# Patient Record
Sex: FEMALE | Race: WHITE | NOT HISPANIC OR LATINO | URBAN - METROPOLITAN AREA
[De-identification: names, ages, dates, MRNs, and addresses within clinical notes are randomized per-mention and may not be internally consistent; named-entity substitution may affect disease eponyms.]

---

## 2017-01-02 ENCOUNTER — COMMUNICATION - HEALTHEAST (OUTPATIENT)
Dept: FAMILY MEDICINE | Facility: CLINIC | Age: 77
End: 2017-01-02

## 2017-01-02 DIAGNOSIS — G89.29 CHRONIC PAIN: ICD-10-CM

## 2017-01-29 ENCOUNTER — COMMUNICATION - HEALTHEAST (OUTPATIENT)
Dept: FAMILY MEDICINE | Facility: CLINIC | Age: 77
End: 2017-01-29

## 2017-01-29 DIAGNOSIS — G89.29 CHRONIC PAIN: ICD-10-CM

## 2017-01-29 DIAGNOSIS — E78.5 HYPERLIPIDEMIA: ICD-10-CM

## 2017-01-29 DIAGNOSIS — F32.9 MAJOR DEPRESSION: ICD-10-CM

## 2017-01-29 DIAGNOSIS — R41.3 MEMORY LOSS: ICD-10-CM

## 2017-02-08 ENCOUNTER — COMMUNICATION - HEALTHEAST (OUTPATIENT)
Dept: FAMILY MEDICINE | Facility: CLINIC | Age: 77
End: 2017-02-08

## 2017-02-20 ENCOUNTER — RECORDS - HEALTHEAST (OUTPATIENT)
Dept: ADMINISTRATIVE | Facility: OTHER | Age: 77
End: 2017-02-20

## 2017-02-21 ENCOUNTER — COMMUNICATION - HEALTHEAST (OUTPATIENT)
Dept: FAMILY MEDICINE | Facility: CLINIC | Age: 77
End: 2017-02-21

## 2017-02-21 DIAGNOSIS — R32 UNSPECIFIED URINARY INCONTINENCE: ICD-10-CM

## 2017-03-26 ENCOUNTER — COMMUNICATION - HEALTHEAST (OUTPATIENT)
Dept: FAMILY MEDICINE | Facility: CLINIC | Age: 77
End: 2017-03-26

## 2017-03-26 DIAGNOSIS — E11.8 TYPE 2 DIABETES MELLITUS WITH UNSPECIFIED COMPLICATIONS (H): ICD-10-CM

## 2017-03-26 DIAGNOSIS — J44.9 COPD (CHRONIC OBSTRUCTIVE PULMONARY DISEASE) (H): ICD-10-CM

## 2017-04-24 ENCOUNTER — OFFICE VISIT - HEALTHEAST (OUTPATIENT)
Dept: FAMILY MEDICINE | Facility: CLINIC | Age: 77
End: 2017-04-24

## 2017-04-24 DIAGNOSIS — E78.5 HYPERLIPIDEMIA: ICD-10-CM

## 2017-04-24 DIAGNOSIS — E11.8 TYPE 2 DIABETES MELLITUS WITH UNSPECIFIED COMPLICATIONS (H): ICD-10-CM

## 2017-04-24 DIAGNOSIS — I10 ESSENTIAL HYPERTENSION, BENIGN: ICD-10-CM

## 2017-04-24 LAB
CHOLEST SERPL-MCNC: 194 MG/DL
FASTING STATUS PATIENT QL REPORTED: YES
HBA1C MFR BLD: 10.4 % (ref 3.5–6)
HDLC SERPL-MCNC: 50 MG/DL
LDLC SERPL CALC-MCNC: 119 MG/DL
TRIGL SERPL-MCNC: 126 MG/DL

## 2017-04-24 ASSESSMENT — MIFFLIN-ST. JEOR: SCORE: 1489.2

## 2017-04-26 ENCOUNTER — RECORDS - HEALTHEAST (OUTPATIENT)
Dept: ADMINISTRATIVE | Facility: OTHER | Age: 77
End: 2017-04-26

## 2017-04-27 ENCOUNTER — AMBULATORY - HEALTHEAST (OUTPATIENT)
Dept: PODIATRY | Age: 77
End: 2017-04-27

## 2017-04-27 ENCOUNTER — COMMUNICATION - HEALTHEAST (OUTPATIENT)
Dept: FAMILY MEDICINE | Facility: CLINIC | Age: 77
End: 2017-04-27

## 2017-04-27 DIAGNOSIS — M79.673 PAIN OF FOOT, UNSPECIFIED LATERALITY: ICD-10-CM

## 2017-04-27 DIAGNOSIS — E11.8 TYPE 2 DIABETES MELLITUS WITH UNSPECIFIED COMPLICATIONS (H): ICD-10-CM

## 2017-04-27 DIAGNOSIS — L60.2 ONYCHAUXIS: ICD-10-CM

## 2017-04-28 ENCOUNTER — COMMUNICATION - HEALTHEAST (OUTPATIENT)
Dept: CARDIOLOGY | Facility: CLINIC | Age: 77
End: 2017-04-28

## 2017-04-28 DIAGNOSIS — I48.0 PAROXYSMAL ATRIAL FIBRILLATION (H): ICD-10-CM

## 2017-05-04 ENCOUNTER — COMMUNICATION - HEALTHEAST (OUTPATIENT)
Dept: CARDIOLOGY | Facility: CLINIC | Age: 77
End: 2017-05-04

## 2017-05-30 ENCOUNTER — COMMUNICATION - HEALTHEAST (OUTPATIENT)
Dept: FAMILY MEDICINE | Facility: CLINIC | Age: 77
End: 2017-05-30

## 2017-06-05 ENCOUNTER — RECORDS - HEALTHEAST (OUTPATIENT)
Dept: GENERAL RADIOLOGY | Facility: CLINIC | Age: 77
End: 2017-06-05

## 2017-06-05 ENCOUNTER — OFFICE VISIT - HEALTHEAST (OUTPATIENT)
Dept: FAMILY MEDICINE | Facility: CLINIC | Age: 77
End: 2017-06-05

## 2017-06-05 DIAGNOSIS — M25.511 PAIN IN RIGHT SHOULDER: ICD-10-CM

## 2017-06-05 DIAGNOSIS — M25.511 RIGHT SHOULDER PAIN: ICD-10-CM

## 2017-06-05 DIAGNOSIS — E11.8 TYPE 2 DIABETES MELLITUS WITH UNSPECIFIED COMPLICATIONS (H): ICD-10-CM

## 2017-06-05 ASSESSMENT — MIFFLIN-ST. JEOR: SCORE: 1524.92

## 2017-06-14 ENCOUNTER — COMMUNICATION - HEALTHEAST (OUTPATIENT)
Dept: FAMILY MEDICINE | Facility: CLINIC | Age: 77
End: 2017-06-14

## 2017-06-15 ENCOUNTER — COMMUNICATION - HEALTHEAST (OUTPATIENT)
Dept: ADMINISTRATIVE | Facility: CLINIC | Age: 77
End: 2017-06-15

## 2017-06-19 ENCOUNTER — RECORDS - HEALTHEAST (OUTPATIENT)
Dept: ADMINISTRATIVE | Facility: OTHER | Age: 77
End: 2017-06-19

## 2017-06-22 ENCOUNTER — COMMUNICATION - HEALTHEAST (OUTPATIENT)
Dept: FAMILY MEDICINE | Facility: CLINIC | Age: 77
End: 2017-06-22

## 2017-06-22 DIAGNOSIS — I48.0 PAROXYSMAL ATRIAL FIBRILLATION (H): ICD-10-CM

## 2017-06-22 DIAGNOSIS — E11.8 TYPE 2 DIABETES MELLITUS WITH UNSPECIFIED COMPLICATIONS (H): ICD-10-CM

## 2017-06-22 DIAGNOSIS — I10 ESSENTIAL HYPERTENSION, BENIGN: ICD-10-CM

## 2017-06-22 DIAGNOSIS — R32 UNSPECIFIED URINARY INCONTINENCE: ICD-10-CM

## 2017-06-22 DIAGNOSIS — M25.511 SHOULDER PAIN, RIGHT: ICD-10-CM

## 2017-06-22 RX ORDER — DILTIAZEM HYDROCHLORIDE 180 MG/1
180 CAPSULE, EXTENDED RELEASE ORAL DAILY
Qty: 90 CAPSULE | Refills: 1 | Status: SHIPPED | OUTPATIENT
Start: 2017-06-22

## 2017-06-22 RX ORDER — TROSPIUM CHLORIDE 20 MG/1
20 TABLET, FILM COATED ORAL DAILY
Qty: 90 TABLET | Refills: 0 | Status: SHIPPED | OUTPATIENT
Start: 2017-06-22

## 2017-06-27 ENCOUNTER — COMMUNICATION - HEALTHEAST (OUTPATIENT)
Dept: FAMILY MEDICINE | Facility: CLINIC | Age: 77
End: 2017-06-27

## 2017-06-27 DIAGNOSIS — G89.29 CHRONIC PAIN: ICD-10-CM

## 2017-06-28 ENCOUNTER — HOSPITAL ENCOUNTER (OUTPATIENT)
Dept: MRI IMAGING | Facility: CLINIC | Age: 77
Discharge: HOME | End: 2017-06-28
Attending: FAMILY MEDICINE

## 2017-06-28 DIAGNOSIS — M25.511 RIGHT SHOULDER PAIN: ICD-10-CM

## 2017-07-01 ENCOUNTER — COMMUNICATION - HEALTHEAST (OUTPATIENT)
Dept: FAMILY MEDICINE | Facility: CLINIC | Age: 77
End: 2017-07-01

## 2017-07-06 ENCOUNTER — HOSPITAL ENCOUNTER (OUTPATIENT)
Dept: MRI IMAGING | Facility: CLINIC | Age: 77
Discharge: HOME | End: 2017-07-06
Attending: FAMILY MEDICINE

## 2017-07-07 ENCOUNTER — COMMUNICATION - HEALTHEAST (OUTPATIENT)
Dept: FAMILY MEDICINE | Facility: CLINIC | Age: 77
End: 2017-07-07

## 2017-07-10 ENCOUNTER — COMMUNICATION - HEALTHEAST (OUTPATIENT)
Dept: FAMILY MEDICINE | Facility: CLINIC | Age: 77
End: 2017-07-10

## 2017-07-10 ENCOUNTER — RECORDS - HEALTHEAST (OUTPATIENT)
Dept: ADMINISTRATIVE | Facility: OTHER | Age: 77
End: 2017-07-10

## 2017-07-12 ENCOUNTER — COMMUNICATION - HEALTHEAST (OUTPATIENT)
Dept: FAMILY MEDICINE | Facility: CLINIC | Age: 77
End: 2017-07-12

## 2017-07-13 ENCOUNTER — COMMUNICATION - HEALTHEAST (OUTPATIENT)
Dept: FAMILY MEDICINE | Facility: CLINIC | Age: 77
End: 2017-07-13

## 2017-07-14 ENCOUNTER — OFFICE VISIT - HEALTHEAST (OUTPATIENT)
Dept: GERIATRICS | Facility: CLINIC | Age: 77
End: 2017-07-14

## 2017-07-14 DIAGNOSIS — I10 ESSENTIAL HYPERTENSION, BENIGN: ICD-10-CM

## 2017-07-14 DIAGNOSIS — J44.89 CHRONIC AIRWAY OBSTRUCTION, NOT ELSEWHERE CLASSIFIED: ICD-10-CM

## 2017-07-14 DIAGNOSIS — I48.0 PAROXYSMAL ATRIAL FIBRILLATION (H): ICD-10-CM

## 2017-07-14 DIAGNOSIS — E11.8 TYPE 2 DIABETES MELLITUS WITH UNSPECIFIED COMPLICATIONS (H): ICD-10-CM

## 2017-07-14 RX ORDER — ACETAMINOPHEN 500 MG
1000 TABLET ORAL 3 TIMES DAILY PRN
Status: SHIPPED | COMMUNITY
Start: 2017-07-14

## 2017-07-14 RX ORDER — INSULIN GLARGINE 100 [IU]/ML
30 INJECTION, SOLUTION SUBCUTANEOUS AT BEDTIME
Status: SHIPPED | COMMUNITY
Start: 2017-07-14

## 2017-07-14 ASSESSMENT — MIFFLIN-ST. JEOR: SCORE: 1534.56

## 2017-07-17 ENCOUNTER — OFFICE VISIT - HEALTHEAST (OUTPATIENT)
Dept: GERIATRICS | Facility: CLINIC | Age: 77
End: 2017-07-17

## 2017-07-17 DIAGNOSIS — I48.0 PAROXYSMAL ATRIAL FIBRILLATION (H): ICD-10-CM

## 2017-07-17 DIAGNOSIS — F41.1 ANXIETY STATE: ICD-10-CM

## 2017-07-17 DIAGNOSIS — R32 UNSPECIFIED URINARY INCONTINENCE: ICD-10-CM

## 2017-07-17 DIAGNOSIS — E11.8 TYPE 2 DIABETES MELLITUS WITH UNSPECIFIED COMPLICATIONS (H): ICD-10-CM

## 2017-07-17 DIAGNOSIS — R41.3 MEMORY LOSS: ICD-10-CM

## 2017-07-17 DIAGNOSIS — R29.6 FREQUENT FALLS: ICD-10-CM

## 2017-07-18 ENCOUNTER — AMBULATORY - HEALTHEAST (OUTPATIENT)
Dept: GERIATRICS | Facility: CLINIC | Age: 77
End: 2017-07-18

## 2017-07-19 ENCOUNTER — OFFICE VISIT - HEALTHEAST (OUTPATIENT)
Dept: GERIATRICS | Facility: CLINIC | Age: 77
End: 2017-07-19

## 2017-07-19 ENCOUNTER — COMMUNICATION - HEALTHEAST (OUTPATIENT)
Dept: FAMILY MEDICINE | Facility: CLINIC | Age: 77
End: 2017-07-19

## 2017-07-19 DIAGNOSIS — R41.3 MEMORY LOSS: ICD-10-CM

## 2017-07-19 DIAGNOSIS — J44.89 CHRONIC AIRWAY OBSTRUCTION, NOT ELSEWHERE CLASSIFIED: ICD-10-CM

## 2017-07-19 DIAGNOSIS — I48.0 PAROXYSMAL ATRIAL FIBRILLATION (H): ICD-10-CM

## 2017-07-19 DIAGNOSIS — E11.8 TYPE 2 DIABETES MELLITUS WITH UNSPECIFIED COMPLICATIONS (H): ICD-10-CM

## 2017-07-19 DIAGNOSIS — F32.9 MAJOR DEPRESSION: ICD-10-CM

## 2017-07-19 DIAGNOSIS — E78.5 HYPERLIPIDEMIA: ICD-10-CM

## 2017-07-19 DIAGNOSIS — I10 ESSENTIAL HYPERTENSION, BENIGN: ICD-10-CM

## 2017-07-19 DIAGNOSIS — G89.29 CHRONIC PAIN: ICD-10-CM

## 2017-07-19 RX ORDER — LISINOPRIL 2.5 MG/1
5 TABLET ORAL DAILY
Status: SHIPPED | COMMUNITY
Start: 2017-07-19

## 2017-07-21 RX ORDER — DULOXETIN HYDROCHLORIDE 30 MG/1
CAPSULE, DELAYED RELEASE ORAL
Qty: 90 CAPSULE | Refills: 1 | Status: SHIPPED | OUTPATIENT
Start: 2017-07-21

## 2017-07-24 ENCOUNTER — OFFICE VISIT - HEALTHEAST (OUTPATIENT)
Dept: GERIATRICS | Facility: CLINIC | Age: 77
End: 2017-07-24

## 2017-07-24 DIAGNOSIS — J44.89 CHRONIC AIRWAY OBSTRUCTION, NOT ELSEWHERE CLASSIFIED: ICD-10-CM

## 2017-07-24 DIAGNOSIS — R41.3 MEMORY LOSS: ICD-10-CM

## 2017-07-24 DIAGNOSIS — E11.8 TYPE 2 DIABETES MELLITUS WITH UNSPECIFIED COMPLICATIONS (H): ICD-10-CM

## 2017-07-24 DIAGNOSIS — I48.0 PAROXYSMAL ATRIAL FIBRILLATION (H): ICD-10-CM

## 2017-07-31 ENCOUNTER — OFFICE VISIT - HEALTHEAST (OUTPATIENT)
Dept: GERIATRICS | Facility: CLINIC | Age: 77
End: 2017-07-31

## 2017-07-31 DIAGNOSIS — G89.4 CHRONIC PAIN SYNDROME: ICD-10-CM

## 2017-07-31 DIAGNOSIS — I10 ESSENTIAL HYPERTENSION, BENIGN: ICD-10-CM

## 2017-07-31 DIAGNOSIS — F32.9 MAJOR DEPRESSION: ICD-10-CM

## 2017-07-31 DIAGNOSIS — E11.8 TYPE 2 DIABETES MELLITUS WITH UNSPECIFIED COMPLICATIONS (H): ICD-10-CM

## 2017-07-31 RX ORDER — TRAMADOL HYDROCHLORIDE 50 MG/1
50 TABLET ORAL EVERY 6 HOURS PRN
Status: SHIPPED | COMMUNITY
Start: 2017-07-31

## 2017-07-31 ASSESSMENT — MIFFLIN-ST. JEOR: SCORE: 1489.2

## 2017-08-07 ENCOUNTER — OFFICE VISIT - HEALTHEAST (OUTPATIENT)
Dept: GERIATRICS | Facility: CLINIC | Age: 77
End: 2017-08-07

## 2017-08-07 DIAGNOSIS — E11.8 TYPE 2 DIABETES MELLITUS WITH UNSPECIFIED COMPLICATIONS (H): ICD-10-CM

## 2017-08-07 DIAGNOSIS — I10 ESSENTIAL HYPERTENSION, BENIGN: ICD-10-CM

## 2017-08-07 DIAGNOSIS — I48.0 PAROXYSMAL ATRIAL FIBRILLATION (H): ICD-10-CM

## 2017-08-07 DIAGNOSIS — G89.4 CHRONIC PAIN SYNDROME: ICD-10-CM

## 2017-08-07 RX ORDER — METOPROLOL SUCCINATE 25 MG/1
12.5 TABLET, EXTENDED RELEASE ORAL DAILY
Status: SHIPPED | COMMUNITY
Start: 2017-08-07

## 2017-08-07 ASSESSMENT — MIFFLIN-ST. JEOR: SCORE: 1439.31

## 2017-08-09 ENCOUNTER — OFFICE VISIT - HEALTHEAST (OUTPATIENT)
Dept: GERIATRICS | Facility: CLINIC | Age: 77
End: 2017-08-09

## 2017-08-09 DIAGNOSIS — R41.3 MEMORY LOSS: ICD-10-CM

## 2017-08-09 DIAGNOSIS — E11.8 TYPE 2 DIABETES MELLITUS WITH UNSPECIFIED COMPLICATIONS (H): ICD-10-CM

## 2017-08-09 DIAGNOSIS — F32.9 MAJOR DEPRESSION: ICD-10-CM

## 2017-08-09 DIAGNOSIS — E78.5 HYPERLIPIDEMIA: ICD-10-CM

## 2017-08-09 DIAGNOSIS — J44.89 CHRONIC AIRWAY OBSTRUCTION, NOT ELSEWHERE CLASSIFIED: ICD-10-CM

## 2017-08-10 ENCOUNTER — COMMUNICATION - HEALTHEAST (OUTPATIENT)
Dept: FAMILY MEDICINE | Facility: CLINIC | Age: 77
End: 2017-08-10

## 2017-08-10 ENCOUNTER — AMBULATORY - HEALTHEAST (OUTPATIENT)
Dept: PODIATRY | Age: 77
End: 2017-08-10

## 2017-08-10 DIAGNOSIS — E11.8 TYPE 2 DIABETES MELLITUS WITH UNSPECIFIED COMPLICATIONS (H): ICD-10-CM

## 2017-08-10 DIAGNOSIS — L60.2 ONYCHAUXIS: ICD-10-CM

## 2017-08-10 DIAGNOSIS — M79.673 PAIN OF FOOT, UNSPECIFIED LATERALITY: ICD-10-CM

## 2017-08-10 ASSESSMENT — MIFFLIN-ST. JEOR: SCORE: 1439.31

## 2017-08-14 ENCOUNTER — OFFICE VISIT - HEALTHEAST (OUTPATIENT)
Dept: GERIATRICS | Facility: CLINIC | Age: 77
End: 2017-08-14

## 2017-08-14 DIAGNOSIS — F41.1 ANXIETY STATE: ICD-10-CM

## 2017-08-14 DIAGNOSIS — I10 ESSENTIAL HYPERTENSION, BENIGN: ICD-10-CM

## 2017-08-14 DIAGNOSIS — J44.89 CHRONIC AIRWAY OBSTRUCTION, NOT ELSEWHERE CLASSIFIED: ICD-10-CM

## 2017-08-14 DIAGNOSIS — E11.8 TYPE 2 DIABETES MELLITUS WITH UNSPECIFIED COMPLICATIONS (H): ICD-10-CM

## 2017-08-17 ENCOUNTER — AMBULATORY - HEALTHEAST (OUTPATIENT)
Dept: GERIATRICS | Facility: CLINIC | Age: 77
End: 2017-08-17

## 2018-01-15 ENCOUNTER — COMMUNICATION - HEALTHEAST (OUTPATIENT)
Dept: ADMINISTRATIVE | Facility: CLINIC | Age: 78
End: 2018-01-15

## 2018-04-04 ENCOUNTER — COMMUNICATION - HEALTHEAST (OUTPATIENT)
Dept: FAMILY MEDICINE | Facility: CLINIC | Age: 78
End: 2018-04-04

## 2018-05-21 ENCOUNTER — RECORDS - HEALTHEAST (OUTPATIENT)
Dept: ADMINISTRATIVE | Facility: OTHER | Age: 78
End: 2018-05-21

## 2021-05-25 ENCOUNTER — RECORDS - HEALTHEAST (OUTPATIENT)
Dept: ADMINISTRATIVE | Facility: CLINIC | Age: 81
End: 2021-05-25

## 2021-05-28 ENCOUNTER — RECORDS - HEALTHEAST (OUTPATIENT)
Dept: ADMINISTRATIVE | Facility: CLINIC | Age: 81
End: 2021-05-28

## 2021-05-30 VITALS — WEIGHT: 235 LBS | HEIGHT: 62 IN | BODY MASS INDEX: 43.24 KG/M2

## 2021-05-31 ENCOUNTER — RECORDS - HEALTHEAST (OUTPATIENT)
Dept: ADMINISTRATIVE | Facility: CLINIC | Age: 81
End: 2021-05-31

## 2021-05-31 VITALS — BODY MASS INDEX: 41.22 KG/M2 | WEIGHT: 224 LBS | HEIGHT: 62 IN

## 2021-05-31 VITALS — BODY MASS INDEX: 44.96 KG/M2 | WEIGHT: 245.8 LBS

## 2021-05-31 VITALS — WEIGHT: 242 LBS | BODY MASS INDEX: 44.53 KG/M2 | HEIGHT: 62 IN

## 2021-05-31 VITALS — BODY MASS INDEX: 41.12 KG/M2 | WEIGHT: 224.8 LBS

## 2021-05-31 VITALS — HEIGHT: 62 IN | WEIGHT: 235 LBS | BODY MASS INDEX: 43.24 KG/M2

## 2021-05-31 VITALS — WEIGHT: 224 LBS | BODY MASS INDEX: 40.97 KG/M2

## 2021-05-31 VITALS — HEIGHT: 62 IN | BODY MASS INDEX: 45.08 KG/M2 | WEIGHT: 245 LBS

## 2021-06-10 NOTE — PROGRESS NOTES
ASSESSMENT: Onychauxis, diabetes, foot pain.    PLAN: Toenails were debrided manually and mechanically x10. Return to clinic in nine weeks.         SUBJECTIVE: Toenails are long, thick and painful. Last nail debridement in the clinic was December 29, 2016.     OBJECTIVE:  Vascular: DP pulses are diminished. PT pulses are absent. Pedal hair is absent. Feet are cool to the touch.  Neuro: Sensation in the feet is grossly intact to light touch.  Derm:  Toenails are elongated, thickened and dystrophic with discoloration and subungual debris. Skin is thin and shiny but intact.   Musculoskeletal: Hammertoes. She walks with a cane.

## 2021-06-10 NOTE — PROGRESS NOTES
Assessment:  1.  Non-insulin-dependent diabetes mellitus, uncontrolled.  2.  Hypertension controlled.  3.  Hyperlipidemia, checking status.  4.  Chronic obstructive pulmonary disease.  5.  Memory loss but she and her  feel that she is stable at home at this time.  6.  History of the atrial fibrillation.    Plan: Recommend she check her blood sugars twice a day and record them and her  can help with that.  She can test different times of the day on different days to end up being able to look at 4 times a day over the whole week.  Recommend she stop the candy that she is eating and stop the daily ice cream.  Also look at the cream that she adds to coffee etc.  She has many options for reducing her calorie intake.  Again explained the importance of weight reduction, since she gained 15 pounds in the last 3 months with her eating habits.  Spent well in excess of 25 minutes with least 50% in counseling.  If her blood sugars improved by cutting out the candy and ice cream and watching portion sizes etc. then she can follow-up in 3 months for next check on diabetes.  If blood sugars are not improved without then recommend that she see our diabetic nurse educator for a consultation.  She and her  understand and agree.  I recommend a follow-up with cardiology, although her eye issue and a potential bleed could be affected by being on the Xarelto, given the rest of her situation it would also be appropriate to stay on the Xarelto to reduce the risk of heart attack or stroke.    4/25/2017 Addendum: See result note of labs. When note written, I was thinking she was off atorvastatin, but that is likely not the case. I tried to call home phone now, but no answer. It is likely that she missed some doses in the last week and that is why the lipids are higher. Recommend she try to take her atorvastatin faithfully and recheck level in 3 months.     Subjective: 76-year-old female presenting for follow-up on the  above.  Regarding diabetes she does not bring in the blood sugars that I had earlier asked for her to bring in given her last A1c was 8.2%.  They state that she is checking blood sugars once or twice a day but that they are often high such as 260.  She does have daily candy and daily ice cream.  She also has creamer in her coffee.  She certainly is not following a diabetic diet at all.  She is now seeing the eye clinic and sees retinal specialty.    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 Armando at vitreoretinal surgery.  Her  wonders if she should go off the Xarelto because of her eye difficulty.  Because of the history of the atrial fibrillation cardiology had put her on Xarelto.  She denies any current trouble with chest pain.  Breathing has been doing okay.  Other than the eyes she denies any other new issues.  Past Medical History:   Diagnosis Date     COPD (chronic obstructive pulmonary disease)      Current Outpatient Prescriptions   Medication Sig Dispense Refill     ACCU-[employer] MUNIRA PLUS TEST STRP strips USE 1 STRIP AS DIRECTED TWO TIMES A  each 2     albuterol (PROAIR HFA) 90 mcg/actuation inhaler Inhale 1-2 puffs every 4 (four) hours as needed. Every 4-6 hours as needed.       albuterol (PROVENTIL) 2.5 mg /3 mL (0.083 %) nebulizer solution Take 2.5 mg by nebulization every 6 (six) hours as needed for wheezing.       atorvastatin (LIPITOR) 80 MG tablet TAKE ONE TABLET BY MOUTH AT BEDTIME 90 tablet 1     cholecalciferol, vitamin D3, (VITAMIN D3) 1,000 unit capsule Take 1,000 Units by mouth daily.       DILT- mg 24 hr capsule        DULoxetine (CYMBALTA) 30 MG capsule TAKE ONE CAPSULE BY MOUTH EVERY EVENING 90 capsule 1     DULoxetine (CYMBALTA) 60 MG capsule TAKE ONE CAPSULE BY MOUTH EVERY DAY 90 capsule 1     lancing device with lancets (ACCU-[employer] MULTICLIX LANCET) Kit Use 1 strip As Directed 2 (two) times a day. 200 each 5     metFORMIN  "(GLUCOPHAGE) 1000 MG tablet TAKE ONE TABLET BY MOUTH TWO TIMES A DAY WITH MEALS 180 tablet 0     rivaroxaban (XARELTO) 20 mg Tab Take 1 tablet (20 mg total) by mouth daily with supper. 90 tablet 3     SPIRIVA WITH HANDIHALER 18 mcg inhalation capsule INHALE THE CONTENTS OF 1 CAPSULE ONCE DAILY USING HANDIHALER DEVICE. TO GET FULL DOSE, BREATHE OUT AND INHALE ONCE AGAIN. 90 capsule 1     trospium (SANCTURA) 20 mg tablet TAKE ONE TABLET BY MOUTH EVERY DAY 90 tablet 0     No current facility-administered medications for this visit.      Allergies   Allergen Reactions     Codeine Nausea And Vomiting     Meperidine Unknown     Pt does not recall     Tetanus Toxoid Unknown     Tramadol Other (See Comments)     Annotation: legs restless and felt pain worse       Doxycycline Hyclate Rash     Sulfa (Sulfonamide Antibiotics) Rash     Annotation: see on call nursing note- itchy raised rash after on tmp-smz for 7 days       She denies any swelling in her legs.  She did have a fall around [place] time but has not had any recent falls.  Her  notes that she was in [facility] for just 2 days in their memory unit and then he states it did not work out so he took her back home and feels they have been doing okay at home at this point.    Objective:/78  Pulse 84  Ht 5' 2\" (1.575 m)  Wt (!) 235 lb (106.6 kg)  BMI 42.98 kg/m2  HEENT shows no acute change.  Neck supple.  Lungs clear.  Heart regular rate and rhythm without murmur.  Abdomen rotund but no masses tenderness or hepatosplenomegaly.  No pedal edema.  She her weight is up 15 pounds from last visit.  "

## 2021-06-11 NOTE — PROGRESS NOTES
[facility]      Facility:    [facility] [[id number]]  Code Status: UNKNOWN      Chief Complaint/Reason for Visit:  Chief Complaint   Patient presents with     Review Of Multiple Medical Conditions       HPI:   Jody is a 76 y.o. female who presents to the hospital with right shoulder pain and shortness of breath.  Patient arrived via EMS report for concerns of COPD exacerbation and right shoulder pain.  She stated that she has had this pain for weeks, has not been evaluated primary care and orthopedics.  On July 12, 2017 the patient states that she she was getting in the bathroom when her  saw that she was short of breath and said that he is not doing this for the rest of the week.  EMS was called and the patient was brought to the emergency room for further evaluation.  In the ER she endorsed right shoulder pain, denied any change, described it as stiff and rates it 5 out of 10 on a pain scale.  Previous MRI of the right shoulder was conducted on July 6, 2017.  Conclusion was no evidence of partial or full-thickness rotator rotator cuff tear.  No evidence of a partial or full-thickness rotator cuff tear. Mild tendinopathy in the subscapularis and supraspinatus tendons. Moderate-sized glenohumeral joint effusion with some localized synovitis in the subscapularis recess but no evidence of intra-articular loose bodies. Mild degenerative osteoarthritic changes at the glenohumeral.  She was diagnosed with right shoulder pain related to frozen shoulder. A x-ray of the chest was completed on July 12, 2017 and it revealed there was opacification present peripherally on the right upper lobe which could relate to developing pneumonia.  She was given IV fluids and DuoNeb for COPD exacerbation.  Per possibility of pneumonia and comorbidities she was given ceftriaxone and discharged on Levaquin, which will end on July 22, 2017.  Her labs showed no acute concerning findings.   Troponin was negative ECG was nonischemic, though PCO2 was 68 though she is a retainer with a home oxygen 3 L per nasal cannula.     TCU: Today I found Jody laying in bed.  She stated that she did not necessarily like the food here as well, which was the case previously.  We also talked about her blood sugars as we have recently increased her Lantus 18 units at bedtime from the previous, 10 units on admission.  Today I am going to increase her to 20 units at at bedtime.  Her blood sugars fasting have been 150-159, lunch 219-305, and supper 121-214, and at bedtime 132-227.  Also per her right frozen shoulder I have ordered Tylenol 1000 mg 3 times daily as needed.  This seems to be a chronic problem.  Previous imaging has been negative.  We have also trying to wean her off her oxygen though currently she is at 3 L nasal cannula and has no desire to decrease that level of O2.     Patient denies pain, headache, chest pain, numbness or tingling, shortest of breath when sitting though is on 3L NC chronically, eating or swallowing concerns, nausea or vomiting, diarrhea or bowel abnormalities, or no new integumentary concerns today.      Past Medical History:  Past Medical History:   Diagnosis Date     COPD (chronic obstructive pulmonary disease)            Surgical History:  Past Surgical History:   Procedure Laterality Date     MS REMOVAL OF TONSILS,<11 Y/O      Description: Tonsillectomy;  Recorded: 04/15/2008;     MS TOTAL HIP ARTHROPLASTY      Description: Total Hip Replacement;  Proc Date: 07/21/2010;  Comments: at [facility]       Family History:   No family history on file.    Social History:    Social History     Social History     Marital status:      Spouse name: N/A     Number of children: N/A     Years of education: N/A     Social History Main Topics     Smoking status: Former Smoker     Smokeless tobacco: Not on file     Alcohol use Yes      Comment: 4 beers per year     Drug use: No     Sexual  activity: Not on file     Other Topics Concern     Not on file     Social History Narrative        Review of Systems   Constitutional: Positive for activity change, appetite change and fatigue. Negative for diaphoresis.   HENT: Negative.  Negative for congestion and facial swelling.    Eyes: Negative.    Respiratory: Positive for shortness of breath. Negative for cough, chest tightness, wheezing and stridor.         SOB when doing therapy, on 3L NC   Cardiovascular: Negative.  Negative for chest pain.   Endocrine: Negative.    Genitourinary: Negative.    Musculoskeletal: Positive for back pain.        Severe weakness on R UE   Skin: Negative.    Allergic/Immunologic: Negative.    Neurological: Positive for weakness. Negative for tremors, numbness and headaches.   Psychiatric/Behavioral: Positive for confusion. Negative for agitation and sleep disturbance. The patient is nervous/anxious.         A/O x2-3, some cognitive slowing noted       Vitals:    07/19/17 1108   BP: 138/83   Pulse: 80   Resp: 20   Temp: 98.2  F (36.8  C)   SpO2: 94%   Weight: (!) 245 lb 12.8 oz (111.5 kg)       Physical Exam   Constitutional: She appears well-developed.   HENT:   Head: Normocephalic and atraumatic.   Eyes: Right eye exhibits no discharge. Left eye exhibits no discharge. No scleral icterus.   Neck: Normal range of motion. Neck supple.   Cardiovascular: Exam reveals no gallop and no friction rub.    Murmur heard.  Irregular rate and rhythm, rate controlled, heart tones distant   Pulmonary/Chest: No stridor. No respiratory distress. She has no wheezes.   Diminished, clear, on 3L NC   Abdominal: Soft. Bowel sounds are normal. She exhibits no distension.   Musculoskeletal: She exhibits edema and tenderness.   Weakness on R UE, 1-2+ edema bilaterally LEs, tender posterior on R LE, denies pain   Neurological: She is alert.   A/O x2-3   Skin: Skin is warm and dry. She is not diaphoretic.   Psychiatric:   Seemed aloof     Medication  List:  Current Outpatient Prescriptions   Medication Sig     lisinopril (PRINIVIL,ZESTRIL) 2.5 MG tablet Take 2.5 mg by mouth daily.     ACCU-[employer]K MUNIRA PLUS TEST STRP strips USE 1 STRIP AS DIRECTED TWO TIMES A DAY     acetaminophen (TYLENOL) 500 MG tablet Take 1,000 mg by mouth 3 (three) times a day as needed for pain.     albuterol (PROAIR HFA) 90 mcg/actuation inhaler Inhale 1-2 puffs every 4 (four) hours as needed. Every 4-6 hours as needed.     albuterol (PROVENTIL) 2.5 mg /3 mL (0.083 %) nebulizer solution Take 2.5 mg by nebulization every 6 (six) hours as needed for wheezing.     atorvastatin (LIPITOR) 80 MG tablet Take 80 mg by mouth at bedtime.     cholecalciferol, vitamin D3, (VITAMIN D3) 1,000 unit capsule Take 1,000 Units by mouth daily.     DILT- mg 24 hr capsule Take 1 capsule (180 mg total) by mouth daily.     DULoxetine (CYMBALTA) 30 MG capsule Take 30 mg by mouth every evening.     DULoxetine (CYMBALTA) 60 MG capsule Take 60 mg by mouth daily.     insulin glargine (LANTUS) 100 unit/mL injection Inject 20 Units under the skin at bedtime.      lancing device with lancets (ACCU-[employer] MULTICLIX LANCET) Kit Use 1 strip As Directed 2 (two) times a day.     levoFLOXacin (LEVAQUIN) 750 MG tablet Take 1 tablet (750 mg total) by mouth daily for 10 days.     metFORMIN (GLUCOPHAGE) 1000 MG tablet Take 1 tablet (1,000 mg total) by mouth 2 (two) times a day with meals.     methyl salicylate-menthol (SARATH DIETZ GREASELESS) 15-10 % Crea Apply 1 application topically 2 (two) times a day. To right shoulder     rivaroxaban (XARELTO) 20 mg Tab Take 1 tablet (20 mg total) by mouth daily with supper.     tiotropium (SPIRIVA) 18 mcg inhalation capsule Place 18 mcg into inhaler and inhale daily.     trospium (SANCTURA) 20 mg tablet Take 1 tablet (20 mg total) by mouth daily.     VIT C/E/ZN/COPPR/LUTEIN/ZEAXAN (PRESERVISION AREDS 2 ORAL) Take 1 tablet by mouth 2 (two) times a day.       Labs:  Recent Results (from the  past 240 hour(s))   Basic Metabolic Panel   Result Value Ref Range    Sodium 142 136 - 145 mmol/L    Potassium 4.8 3.5 - 5.0 mmol/L    Chloride 103 98 - 107 mmol/L    CO2 30 22 - 31 mmol/L    Anion Gap, Calculation 9 5 - 18 mmol/L    Glucose 407 (HH) 70 - 125 mg/dL    Calcium 10.3 8.5 - 10.5 mg/dL    BUN 24 8 - 28 mg/dL    Creatinine 0.96 0.60 - 1.10 mg/dL    GFR MDRD Af Amer >60 >60 mL/min/1.73m2    GFR MDRD Non Af Amer 57 (L) >60 mL/min/1.73m2   HM2 (CBC W/O DIFF)   Result Value Ref Range    WBC 8.2 4.0 - 11.0 thou/uL    RBC 4.83 3.80 - 5.40 mill/uL    Hemoglobin 13.3 12.0 - 16.0 g/dL    Hematocrit 42.4 35.0 - 47.0 %    MCV 88 80 - 100 fL    MCH 27.5 27.0 - 34.0 pg    MCHC 31.4 (L) 32.0 - 36.0 g/dL    RDW 15.0 (H) 11.0 - 14.5 %    Platelets 164 140 - 440 thou/uL    MPV 13.4 (H) 8.5 - 12.5 fL   Troponin I   Result Value Ref Range    Troponin I 0.01 0.00 - 0.29 ng/mL   BNP(B-type Natriuretic Peptide)   Result Value Ref Range    BNP 20 0 - 140 pg/mL   ECG 12 lead nursing unit performed   Result Value Ref Range    SYSTOLIC BLOOD PRESSURE 185 mmHg    DIASTOLIC BLOOD PRESSURE 80 mmHg    VENTRICULAR RATE 74 BPM    ATRIAL RATE 74 BPM    P-R INTERVAL 172 ms    QRS DURATION 88 ms    Q-T INTERVAL 380 ms    QTC CALCULATION (BEZET) 421 ms    P Axis 61 degrees    R AXIS 68 degrees    T AXIS 68 degrees    MUSE DIAGNOSIS       Normal sinus rhythm  Low voltage QRS  Cannot rule out Anterior infarct , age undetermined  Abnormal ECG  When compared with ECG of 12-JAN-2016 13:58,  Minimal criteria for Anterior infarct are now Present  Confirmed by AGUSTINA CASTREJON MD LOC:[facility] ([id number]) on 7/12/2017 3:44:40 PM     Blood Gases, Venous   Result Value Ref Range    pH, Venous 7.31 (L) 7.35 - 7.45    pCO2, Venous 68 (H) 35 - 50 mm Hg    pO2, Daniel 26 25 - 47 mm Hg    Base Excess, Venous 5.5 mmol/L    HCO3, Venous 27.7 24.0 - 30.0 mmol/L    Oxyhemoglobin 43.6 (L) 70.0 - 75.0 %    O2 Sat, Venous 45.1 (L) 70.0 - 75.0 %     MRI OF THE RIGHT  SHOULDER  7/6/2017 12:03 PM     INDICATION: 76-year-old with severe shoulder pain. No prior right shoulder surgery.  TECHNIQUE: Routine.  Exam was initially started on 06/28/2017, however because of hardware failure the patient had to return on 7/6/2017 for a complete study.  COMPARISON: 06/05/2017.     FINDINGS: The exam is mildly degraded by patient motion which persisted throughout the entire study.      ROTATOR CUFF: There is no evidence of a partial or full-thickness rotator cuff tear. There is mild tendinopathy in the subscapularis tendon as seen on series 3 image 12. Mild tendinopathy in the supraspinatus tendon as seen on series 5.2 image 15. The   infraspinatus and teres minor tendons are intact. No evidence of acute injury or atrophy of any of the rotator cuff muscles.     ACROMIOCLAVICULAR REGION: Type II acromion anatomy without significant enthesophyte formation. Moderate AC joint arthrosis with slight inferior articular spurring but no resulting subacromial space narrowing. Small amount of fluid in the   subacromial-subdeltoid recess.     GLENOHUMERAL JOINT: Moderate-sized glenohumeral joint effusion without evidence of intra-articular loose bodies. There is some localized synovitis within the subscapularis recess. Mild diffuse partial-thickness articular cartilage thinning involving the   glenoid and humeral head. There is mild localized subchondral edema in the posterior margin of the glenoid rim. The long head of the biceps tendon is intact and normally located. The glenoid labrum is intact.     BONES AND SOFT TISSUES: No fracture or contusion. No focal marrow replacing lesion. No findings to suggest recent or prior humeral head dislocation.     IMPRESSION:   CONCLUSION:  1.  No evidence of a partial or full-thickness rotator cuff tear.     2.  Mild tendinopathy in the subscapularis and supraspinatus tendons.     3.  Moderate-sized glenohumeral joint effusion with some localized synovitis in the  subscapularis recess but no evidence of intra-articular loose bodies.     4.  Mild degenerative osteoarthritic changes at the glenohumeral joint.    Assessment:    ICD-10-CM    1. Type 2 diabetes mellitus with unspecified complications E11.8    2. Benign Essential Hypertension I10    3. Paroxysmal atrial fibrillation I48.0    4. Chronic Obstructive Pulmonary Disease J44.9      Plan:  1. Increase lantus to 20U at HS  2. Start lisinopril 2.5mg daily  3.Continue NOAC  4. Monitor, on 3L NC, with orders to wean to 88-92%    The care plan has been reviewed and all orders signed. Changes to care plan, if any, as noted. Otherwise, continue care plan of care.    Electronically signed by: Jerardo Cooper NP

## 2021-06-11 NOTE — PROGRESS NOTES
[facility]      Facility:    [facility] [[id number]]  Code Status: UNKNOWN      Chief Complaint/Reason for Visit:  Chief Complaint   Patient presents with     Review Of Multiple Medical Conditions       HPI:   Jody is a 76 y.o. female who presents to the hospital with right shoulder pain and shortness of breath.  Patient arrived via EMS report for concerns of COPD exacerbation and right shoulder pain.  She stated that she has had this pain for weeks, has not been evaluated primary care and orthopedics.  On July 12, 2017 the patient states that she she was getting in the bathroom when her  saw that she was short of breath and said that he is not doing this for the rest of the week.  EMS was called and the patient was brought to the emergency room for further evaluation.  In the ER she endorsed right shoulder pain, denied any change, described it as stiff and rates it 5 out of 10 on a pain scale.  Previous MRI of the right shoulder was conducted on July 6, 2017.  Conclusion was no evidence of partial or full-thickness rotator rotator cuff tear.  No evidence of a partial or full-thickness rotator cuff tear. Mild tendinopathy in the subscapularis and supraspinatus tendons. Moderate-sized glenohumeral joint effusion with some localized synovitis in the subscapularis recess but no evidence of intra-articular loose bodies. Mild degenerative osteoarthritic changes at the glenohumeral.  She was diagnosed with right shoulder pain related to frozen shoulder. A x-ray of the chest was completed on July 12, 2017 and it revealed there was opacification present peripherally on the right upper lobe which could relate to developing pneumonia.  She was given IV fluids and DuoNeb for COPD exacerbation.  Per possibility of pneumonia and comorbidities she was given ceftriaxone and discharged on Levaquin, which will end on July 22, 2017.  Her labs showed no acute concerning findings.   Troponin was negative ECG was nonischemic, though PCO2 was 68 though she is a retainer with a home oxygen 3 L per nasal cannula.     TCU: Today I found Jody sitting on her bedside eating her lunch with complaints of pain related to her right shoulder.  She stated that she did not necessarily like the food here as well.  I asked her what her normal blood sugars have been.  She states that they have been high, 300-500 at times.  I have also noticed her last A1c was 10.4 on April 24, 2017.  I asked her about starting insulin and she stated that it was not necessary.  After discussion about possible side effects of chronic hyperglycemia she decided that it would be pertinent to talked with her  about starting this therapy.  After subsequent communication with  (both on the phone and in person) he decided that it would be in her best interest.  I have ordered Lantus 10 units at bedtime and nursing will be checking her blood sugars 4 times daily.  We will continue to monitor. Per her pain in her right shoulder I have ordered Tylenol 1000 mg 3 times daily as needed.     Patient denies pain, headache, chest pain, numbness or tingling, shortest of breath when sitting though is on 3L NC chronically, eating or swallowing concerns, nausea or vomiting, diarrhea or bowel abnormalities, or no new integumentary concerns today.  Patient's  would like to talk with PCP next visit.      Past Medical History:  Past Medical History:   Diagnosis Date     COPD (chronic obstructive pulmonary disease)            Surgical History:  Past Surgical History:   Procedure Laterality Date     NC REMOVAL OF TONSILS,<13 Y/O      Description: Tonsillectomy;  Recorded: 04/15/2008;     NC TOTAL HIP ARTHROPLASTY      Description: Total Hip Replacement;  Proc Date: 07/21/2010;  Comments: at [facility]       Family History:   No family history on file.    Social History:    Social History     Social History     Marital status:   "    Spouse name: N/A     Number of children: N/A     Years of education: N/A     Social History Main Topics     Smoking status: Former Smoker     Smokeless tobacco: Not on file     Alcohol use Yes      Comment: 4 beers per year     Drug use: No     Sexual activity: Not on file     Other Topics Concern     Not on file     Social History Narrative          Review of Systems   Constitutional: Positive for activity change, appetite change and fatigue. Negative for diaphoresis.   HENT: Negative.  Negative for congestion and facial swelling.    Eyes: Negative.    Respiratory: Positive for shortness of breath. Negative for cough, chest tightness, wheezing and stridor.         SOB when doing therapy, on 3L NC   Cardiovascular: Negative.  Negative for chest pain.   Endocrine: Negative.    Genitourinary: Negative.    Musculoskeletal: Positive for back pain.        Severe weakness on R UE   Skin: Negative.    Allergic/Immunologic: Negative.    Neurological: Positive for weakness. Negative for tremors, numbness and headaches.   Psychiatric/Behavioral: Positive for confusion. Negative for agitation and sleep disturbance. The patient is nervous/anxious.         A/O x2-3, some cognitive slowing noted       Vitals:    07/14/17 1243   BP: 152/82   Pulse: 76   Resp: 18   Temp: 98.3  F (36.8  C)   SpO2: 92%   Weight: (!) 245 lb (111.1 kg)   Height: 5' 2\" (1.575 m)       Physical Exam   Constitutional: She appears well-developed.   HENT:   Head: Normocephalic and atraumatic.   Eyes: Right eye exhibits no discharge. Left eye exhibits no discharge. No scleral icterus.   Neck: Normal range of motion. Neck supple.   Cardiovascular: Exam reveals no gallop and no friction rub.    Murmur heard.  Irregular rate and rhythm, rate controlled, heart tones distant   Pulmonary/Chest: No stridor. No respiratory distress. She has no wheezes.   Diminished, clear, on 3L NC   Abdominal: Soft. Bowel sounds are normal. She exhibits no distension. "   Musculoskeletal: She exhibits edema and tenderness.   Weakness on R UE, 1-2+ edema bilaterally LEs, tender posterior on R LE, denies pain   Neurological: She is alert.   A/O x2-3   Skin: Skin is warm and dry. She is not diaphoretic.   Psychiatric:   Seemed aloof       Medication List:  Current Outpatient Prescriptions   Medication Sig     acetaminophen (TYLENOL) 500 MG tablet Take 1,000 mg by mouth 3 (three) times a day as needed for pain.     insulin glargine (LANTUS) 100 unit/mL injection Inject 10 Units under the skin at bedtime.     ACCU-CHEK MUNIRA PLUS TEST STRP strips USE 1 STRIP AS DIRECTED TWO TIMES A DAY     albuterol (PROAIR HFA) 90 mcg/actuation inhaler Inhale 1-2 puffs every 4 (four) hours as needed. Every 4-6 hours as needed.     albuterol (PROVENTIL) 2.5 mg /3 mL (0.083 %) nebulizer solution Take 2.5 mg by nebulization every 6 (six) hours as needed for wheezing.     atorvastatin (LIPITOR) 80 MG tablet Take 80 mg by mouth at bedtime.     cholecalciferol, vitamin D3, (VITAMIN D3) 1,000 unit capsule Take 1,000 Units by mouth daily.     DILT- mg 24 hr capsule Take 1 capsule (180 mg total) by mouth daily.     DULoxetine (CYMBALTA) 30 MG capsule Take 30 mg by mouth every evening.     DULoxetine (CYMBALTA) 60 MG capsule Take 60 mg by mouth daily.     lancing device with lancets (ACCU-What[employer] MULTICLIX LANCET) Kit Use 1 strip As Directed 2 (two) times a day.     levoFLOXacin (LEVAQUIN) 750 MG tablet Take 1 tablet (750 mg total) by mouth daily for 10 days.     metFORMIN (GLUCOPHAGE) 1000 MG tablet Take 1 tablet (1,000 mg total) by mouth 2 (two) times a day with meals.     rivaroxaban (XARELTO) 20 mg Tab Take 1 tablet (20 mg total) by mouth daily with supper.     tiotropium (SPIRIVA) 18 mcg inhalation capsule Place 18 mcg into inhaler and inhale daily.     trospium (SANCTURA) 20 mg tablet Take 1 tablet (20 mg total) by mouth daily.     VIT C/E/ZN/COPPR/LUTEIN/ZEAXAN (PRESERVISION AREDS 2 ORAL) Take 1 tablet  by mouth 2 (two) times a day.       Labs:  Recent Results (from the past 240 hour(s))   Basic Metabolic Panel   Result Value Ref Range    Sodium 142 136 - 145 mmol/L    Potassium 4.8 3.5 - 5.0 mmol/L    Chloride 103 98 - 107 mmol/L    CO2 30 22 - 31 mmol/L    Anion Gap, Calculation 9 5 - 18 mmol/L    Glucose 407 (HH) 70 - 125 mg/dL    Calcium 10.3 8.5 - 10.5 mg/dL    BUN 24 8 - 28 mg/dL    Creatinine 0.96 0.60 - 1.10 mg/dL    GFR MDRD Af Amer >60 >60 mL/min/1.73m2    GFR MDRD Non Af Amer 57 (L) >60 mL/min/1.73m2   HM2 (CBC W/O DIFF)   Result Value Ref Range    WBC 8.2 4.0 - 11.0 thou/uL    RBC 4.83 3.80 - 5.40 mill/uL    Hemoglobin 13.3 12.0 - 16.0 g/dL    Hematocrit 42.4 35.0 - 47.0 %    MCV 88 80 - 100 fL    MCH 27.5 27.0 - 34.0 pg    MCHC 31.4 (L) 32.0 - 36.0 g/dL    RDW 15.0 (H) 11.0 - 14.5 %    Platelets 164 140 - 440 thou/uL    MPV 13.4 (H) 8.5 - 12.5 fL   Troponin I   Result Value Ref Range    Troponin I 0.01 0.00 - 0.29 ng/mL   BNP(B-type Natriuretic Peptide)   Result Value Ref Range    BNP 20 0 - 140 pg/mL   ECG 12 lead nursing unit performed   Result Value Ref Range    SYSTOLIC BLOOD PRESSURE 185 mmHg    DIASTOLIC BLOOD PRESSURE 80 mmHg    VENTRICULAR RATE 74 BPM    ATRIAL RATE 74 BPM    P-R INTERVAL 172 ms    QRS DURATION 88 ms    Q-T INTERVAL 380 ms    QTC CALCULATION (BEZET) 421 ms    P Axis 61 degrees    R AXIS 68 degrees    T AXIS 68 degrees    MUSE DIAGNOSIS       Normal sinus rhythm  Low voltage QRS  Cannot rule out Anterior infarct , age undetermined  Abnormal ECG  When compared with ECG of 12-JAN-2016 13:58,  Minimal criteria for Anterior infarct are now Present  Confirmed by AGUSTINA CASTREJON MD LOC:[facility] ([id number]) on 7/12/2017 3:44:40 PM     Blood Gases, Venous   Result Value Ref Range    pH, Venous 7.31 (L) 7.35 - 7.45    pCO2, Venous 68 (H) 35 - 50 mm Hg    pO2, Daniel 26 25 - 47 mm Hg    Base Excess, Venous 5.5 mmol/L    HCO3, Venous 27.7 24.0 - 30.0 mmol/L    Oxyhemoglobin 43.6 (L) 70.0 - 75.0 %    O2  Sat, Venous 45.1 (L) 70.0 - 75.0 %     MRI OF THE RIGHT SHOULDER  7/6/2017 12:03 PM     INDICATION: 76-year-old with severe shoulder pain. No prior right shoulder surgery.  TECHNIQUE: Routine.  Exam was initially started on 06/28/2017, however because of hardware failure the patient had to return on 7/6/2017 for a complete study.  COMPARISON: 06/05/2017.     FINDINGS: The exam is mildly degraded by patient motion which persisted throughout the entire study.      ROTATOR CUFF: There is no evidence of a partial or full-thickness rotator cuff tear. There is mild tendinopathy in the subscapularis tendon as seen on series 3 image 12. Mild tendinopathy in the supraspinatus tendon as seen on series 5.2 image 15. The   infraspinatus and teres minor tendons are intact. No evidence of acute injury or atrophy of any of the rotator cuff muscles.     ACROMIOCLAVICULAR REGION: Type II acromion anatomy without significant enthesophyte formation. Moderate AC joint arthrosis with slight inferior articular spurring but no resulting subacromial space narrowing. Small amount of fluid in the   subacromial-subdeltoid recess.     GLENOHUMERAL JOINT: Moderate-sized glenohumeral joint effusion without evidence of intra-articular loose bodies. There is some localized synovitis within the subscapularis recess. Mild diffuse partial-thickness articular cartilage thinning involving the   glenoid and humeral head. There is mild localized subchondral edema in the posterior margin of the glenoid rim. The long head of the biceps tendon is intact and normally located. The glenoid labrum is intact.     BONES AND SOFT TISSUES: No fracture or contusion. No focal marrow replacing lesion. No findings to suggest recent or prior humeral head dislocation.     IMPRESSION:   CONCLUSION:  1.  No evidence of a partial or full-thickness rotator cuff tear.     2.  Mild tendinopathy in the subscapularis and supraspinatus tendons.     3.  Moderate-sized glenohumeral  joint effusion with some localized synovitis in the subscapularis recess but no evidence of intra-articular loose bodies.     4.  Mild degenerative osteoarthritic changes at the glenohumeral joint.    Assessment:    ICD-10-CM    1. Type 2 diabetes mellitus with unspecified complications E11.8    2. Benign Essential Hypertension I10    3. Paroxysmal atrial fibrillation I48.0    4. Chronic Obstructive Pulmonary Disease J44.9        Plan:  1. Start lantus 10U at HS  2. Monitor, may need ACE  3.Continue NOAC  4. Monitor, on 3L NC, may need PFT    The care plan has been reviewed and all orders signed. Changes to care plan, if any, as noted. Otherwise, continue care plan of care.  The total time spent with this patient was greater than 40 minutes, with greater than 50% spent in counseling and coordination of care that included multiple issues    Electronically signed by: Jerardo Cooper NP

## 2021-06-11 NOTE — PROGRESS NOTES
[facility]      Code Status:  FULL CODE  Visit Type: H & P     Facility:  [facility] [[id number]]           History of Present Illness: Jody Gil is a 76 y.o. female who is currently admitted to the TCU as a transfer from the emergency room.  She was examined today and history was also supplemented by her  who requested to meet with me as per the history this is a elderly patient who has been progressively declining  In her home for the last few years worse in the last 6 months as per the .  He has noticed increasing physical debility with frequent falls and inability to meet her own needs.  He has been her main caregiver and has been assisting her more and more every day in her ADLs.  He is noticing more episodes of confusion.  She presented to the hospital with shoulder pain associated with shortness of breath.  She does not have much recall of what eventually happened in the emergency room but it was decided at that time that she may have some bronchitis and she was given Levaquin for 10 days.  Her oxygen needs are at baseline at 3 L she is refusing to take her inhalers today.  She is also diabetic and has not been managing her blood sugars very well her A1c was greater than 10 and she has been started on Lantus.  Sugars remain in the 200s+ range in the TCU and patient's  states that she does not manage her diet or her diabetes very well  She continues to complain of shoulder pain and has difficulty ambulating and is in the TCU for rehab    Past Medical History:   Diagnosis Date     COPD (chronic obstructive pulmonary disease)      Past Surgical History:   Procedure Laterality Date     TX REMOVAL OF TONSILS,<11 Y/O      Description: Tonsillectomy;  Recorded: 04/15/2008;     TX TOTAL HIP ARTHROPLASTY      Description: Total Hip Replacement;  Proc Date: 07/21/2010;  Comments: at [facility]     No family history on file.  Social History     Social  History     Marital status:      Spouse name: N/A     Number of children: N/A     Years of education: N/A     Occupational History     Not on file.     Social History Main Topics     Smoking status: Former Smoker     Smokeless tobacco: Not on file     Alcohol use Yes      Comment: 4 beers per year     Drug use: No     Sexual activity: Not on file     Other Topics Concern     Not on file     Social History Narrative     Current Outpatient Prescriptions   Medication Sig Dispense Refill     ACCU-[employer] MUNIRA PLUS TEST STRP strips USE 1 STRIP AS DIRECTED TWO TIMES A  each 2     acetaminophen (TYLENOL) 500 MG tablet Take 1,000 mg by mouth 3 (three) times a day as needed for pain.       albuterol (PROAIR HFA) 90 mcg/actuation inhaler Inhale 1-2 puffs every 4 (four) hours as needed. Every 4-6 hours as needed.       albuterol (PROVENTIL) 2.5 mg /3 mL (0.083 %) nebulizer solution Take 2.5 mg by nebulization every 6 (six) hours as needed for wheezing.       atorvastatin (LIPITOR) 80 MG tablet Take 80 mg by mouth at bedtime.       cholecalciferol, vitamin D3, (VITAMIN D3) 1,000 unit capsule Take 1,000 Units by mouth daily.       DILT- mg 24 hr capsule Take 1 capsule (180 mg total) by mouth daily. 90 capsule 1     DULoxetine (CYMBALTA) 30 MG capsule Take 30 mg by mouth every evening.       DULoxetine (CYMBALTA) 60 MG capsule Take 60 mg by mouth daily.       insulin glargine (LANTUS) 100 unit/mL injection Inject 10 Units under the skin at bedtime.       lancing device with lancets (ACCU[employer] MULTICLIX LANCET) Kit Use 1 strip As Directed 2 (two) times a day. 200 each 5     levoFLOXacin (LEVAQUIN) 750 MG tablet Take 1 tablet (750 mg total) by mouth daily for 10 days. 10 tablet 0     metFORMIN (GLUCOPHAGE) 1000 MG tablet Take 1 tablet (1,000 mg total) by mouth 2 (two) times a day with meals. 180 tablet 0     rivaroxaban (XARELTO) 20 mg Tab Take 1 tablet (20 mg total) by mouth daily with supper. 90 tablet 3      tiotropium (SPIRIVA) 18 mcg inhalation capsule Place 18 mcg into inhaler and inhale daily.       trospium (SANCTURA) 20 mg tablet Take 1 tablet (20 mg total) by mouth daily. 90 tablet 0     VIT C/E/ZN/COPPR/LUTEIN/ZEAXAN (PRESERVISION AREDS 2 ORAL) Take 1 tablet by mouth 2 (two) times a day.       No current facility-administered medications for this visit.      Allergies   Allergen Reactions     Codeine Nausea And Vomiting     Meperidine Unknown     Pt does not recall     Tetanus Toxoid Unknown     Tramadol Other (See Comments)     Annotation: legs restless and felt pain worse       Doxycycline Hyclate Rash     Sulfa (Sulfonamide Antibiotics) Rash     Annotation: see on call nursing note- itchy raised rash after on tmp-smz for 7 days           Review of Systems:    Constitutional: Negative.  Negative for fever, chills,HAS activity change, appetite change and fatigue.   HENT: Negative for congestion and facial swelling.    Eyes: Negative for photophobia, redness and visual disturbance.   Respiratory: Negative for cough and chest tightness.    Cardiovascular: Negative for chest pain, palpitations and leg swelling.   Gastrointestinal: Negative for nausea, diarrhea, constipation, blood in stool and abdominal distention.   Genitourinary: Negative.    Has developed urinary incontinence  Musculoskeletal: Negative.  falls often  Skin: Negative.    Neurological: Negative for dizziness, tremors, syncope, weakness, light-headedness and headaches.   Hematological: Does not bruise/bleed easily.   Psychiatric/Behavioral: Negative.  And no longer participate in her needs because of progressive cognitive decline as per     Vitals:    07/17/17 1029   BP: 138/82   Pulse: 80   Resp: 18   Temp: 98  F (36.7  C)       Physical Exam:    GENERAL: no acute distress. Cooperative in conversation. Obese;rambling  HEENT: pupils are equal, round and reactive. Oral mucosa is moist and intact.  RESP:Chest symmetric. Regular respiratory  rate. No stridor.  CVS: S1S2. IRREGULAR  ABD: Nondistended, soft.  EXTREMITIES: L>R  lower extremity edema.   NEURO: non focal. Alert and oriented x3. CONFUSED AND she has a limited recall  PSYCH: within normal limits. No depression or anxiety.  SKIN: warm dry intact     Labs:    Recent Results (from the past 240 hour(s))   Basic Metabolic Panel   Result Value Ref Range    Sodium 142 136 - 145 mmol/L    Potassium 4.8 3.5 - 5.0 mmol/L    Chloride 103 98 - 107 mmol/L    CO2 30 22 - 31 mmol/L    Anion Gap, Calculation 9 5 - 18 mmol/L    Glucose 407 (HH) 70 - 125 mg/dL    Calcium 10.3 8.5 - 10.5 mg/dL    BUN 24 8 - 28 mg/dL    Creatinine 0.96 0.60 - 1.10 mg/dL    GFR MDRD Af Amer >60 >60 mL/min/1.73m2    GFR MDRD Non Af Amer 57 (L) >60 mL/min/1.73m2   HM2 (CBC W/O DIFF)   Result Value Ref Range    WBC 8.2 4.0 - 11.0 thou/uL    RBC 4.83 3.80 - 5.40 mill/uL    Hemoglobin 13.3 12.0 - 16.0 g/dL    Hematocrit 42.4 35.0 - 47.0 %    MCV 88 80 - 100 fL    MCH 27.5 27.0 - 34.0 pg    MCHC 31.4 (L) 32.0 - 36.0 g/dL    RDW 15.0 (H) 11.0 - 14.5 %    Platelets 164 140 - 440 thou/uL    MPV 13.4 (H) 8.5 - 12.5 fL   Troponin I   Result Value Ref Range    Troponin I 0.01 0.00 - 0.29 ng/mL   BNP(B-type Natriuretic Peptide)   Result Value Ref Range    BNP 20 0 - 140 pg/mL   ECG 12 lead nursing unit performed   Result Value Ref Range    SYSTOLIC BLOOD PRESSURE 185 mmHg    DIASTOLIC BLOOD PRESSURE 80 mmHg    VENTRICULAR RATE 74 BPM    ATRIAL RATE 74 BPM    P-R INTERVAL 172 ms    QRS DURATION 88 ms    Q-T INTERVAL 380 ms    QTC CALCULATION (BEZET) 421 ms    P Axis 61 degrees    R AXIS 68 degrees    T AXIS 68 degrees    MUSE DIAGNOSIS       Normal sinus rhythm  Low voltage QRS  Cannot rule out Anterior infarct , age undetermined  Abnormal ECG  When compared with ECG of 12-JAN-2016 13:58,  Minimal criteria for Anterior infarct are now Present  Confirmed by AGUSTINA CASTREJON MD LOC:[facility] ([id number]) on 7/12/2017 3:44:40 PM     Blood Gases, Venous   Result  Value Ref Range    pH, Venous 7.31 (L) 7.35 - 7.45    pCO2, Venous 68 (H) 35 - 50 mm Hg    pO2, Daniel 26 25 - 47 mm Hg    Base Excess, Venous 5.5 mmol/L    HCO3, Venous 27.7 24.0 - 30.0 mmol/L    Oxyhemoglobin 43.6 (L) 70.0 - 75.0 %    O2 Sat, Venous 45.1 (L) 70.0 - 75.0 %     Xr Chest Ap    Result Date: 7/12/2017  XR CHEST AP 7/12/2017 8:41 AM INDICATION: sob COMPARISON: 12/25/2015 FINDINGS: Lung volumes lower on this AP projection. There is vague opacity present peripheral right upper lobe which could relate to developing pneumonia. Left lung base not well visualized, likely due to patient's size. Heart size is normal.    Xr Shoulder Right 2 Or More Vws    Result Date: 7/12/2017  XR SHOULDER RIGHT 2 OR MORE VWS 7/12/2017 8:41 AM INDICATION: Shoulder pain.    COMPARISON: 6/5/2017 FINDINGS: No significant change. Mild degenerative changes. No fracture or dislocation. There is a vague opacity involving the peripheral right upper lobe, consider follow-up chest x-ray.    Mr Shoulder Without Contrast Right    Result Date: 7/6/2017  MRI OF THE RIGHT SHOULDER 7/6/2017 12:03 PM INDICATION: 76-year-old with severe shoulder pain. No prior right shoulder surgery. TECHNIQUE: Routine. Exam was initially started on 06/28/2017, however because of hardware failure the patient had to return on 7/6/2017 for a complete study. COMPARISON: 06/05/2017. FINDINGS: The exam is mildly degraded by patient motion which persisted throughout the entire study. ROTATOR CUFF: There is no evidence of a partial or full-thickness rotator cuff tear. There is mild tendinopathy in the subscapularis tendon as seen on series 3 image 12. Mild tendinopathy in the supraspinatus tendon as seen on series 5.2 image 15. The infraspinatus and teres minor tendons are intact. No evidence of acute injury or atrophy of any of the rotator cuff muscles. ACROMIOCLAVICULAR REGION: Type II acromion anatomy without significant enthesophyte formation. Moderate AC joint  arthrosis with slight inferior articular spurring but no resulting subacromial space narrowing. Small amount of fluid in the subacromial-subdeltoid recess. GLENOHUMERAL JOINT: Moderate-sized glenohumeral joint effusion without evidence of intra-articular loose bodies. There is some localized synovitis within the subscapularis recess. Mild diffuse partial-thickness articular cartilage thinning involving the glenoid and humeral head. There is mild localized subchondral edema in the posterior margin of the glenoid rim. The long head of the biceps tendon is intact and normally located. The glenoid labrum is intact. BONES AND SOFT TISSUES: No fracture or contusion. No focal marrow replacing lesion. No findings to suggest recent or prior humeral head dislocation.     CONCLUSION: 1.  No evidence of a partial or full-thickness rotator cuff tear. 2.  Mild tendinopathy in the subscapularis and supraspinatus tendons. 3.  Moderate-sized glenohumeral joint effusion with some localized synovitis in the subscapularis recess but no evidence of intra-articular loose bodies. 4.  Mild degenerative osteoarthritic changes at the glenohumeral joint.   Lab Results   Component Value Date    HGBA1C 10.4 (H) 04/24/2017       Assessment/Plan:   COPD with hypoxic respiratory failure presenting to the hospital with acute shortness of breath suspected to be in part due to noncompliance in my mind after discussing her concerns with her  as well as her  She is on baseline oxygen needs at 3 L by nasal cannula with no weaning attempt to be made  Currently on Levaquin for 10 days continue with the same  Right shoulder pain with the imaging in the hospital done did not show any partial or full-thickness rotator cuff tear however some synovitis with joint effusion was noted and degenerative changes were needed  Conservative treatment with pain management scheduling her Tylenol and therapy if no improvement she can consider a visit to orthopedics  for further management  Diabetes type 2 poorly managed .  She was started on Lantus currently at 10 unit and Accu-Cheks 4 times a day upon admission. my plan is to increase her Lantus further to 18 units and monitor closely.  She is also on Metformin 1 g twice a day.  Seems that she has not been managing her diabetes very well at all partly because of cognitive decline  Paroxysmal atrial fibrillation continue with her Xarelto.  May need to be reassessed if she continues to fall frequently  She also takes diltiazem daily  Hyperlipidemia continue with her Lipitor 80 mg  Mood disorder with anxiety she is taking Cymbalta at a high dose monitor closely  Lymphedema with no change in weights will add Lasix 20 mg to her regimen and also order some wraps-  Cognitive decline associated with failure to thrive decline in self-care increasing urinary incontinence she is here for PT OT and rehab and I had a detailed discussion with her  who is looking at long-term care placement for her.  He feels that he can no longer meet her needs and is looking at long-term placement with eventual decision to move her to [place] to be closer to the daughter.  She is currently participating in PT and OT  Total time spent was 45 minutes, more than half of it was in face-to-face counseling regarding disease state, treatment, side effects, documentation, review of clinical data and coordination of care  Electronically signed by: RO Jaime  This progress note was completed using Dragon software and there may be grammatical errors.

## 2021-06-11 NOTE — PROGRESS NOTES
Assessment:  1.  Right shoulder pain, possible rotator cuff tendinitis versus rupture etc.  2.  Poorly controlled diabetes.    Plan: Schedule MRI of the right shoulder, at [facility], and then get referral to orthopedics at [facility] scheduled following that.  Since June is not here this afternoon, those referrals will likely be handled tomorrow morning and she and her  understand and agree.  Definitely she needs to be following careful diet and avoiding the candy etc. that she is having frequently that would be significantly driving her weight gain and the high blood sugars at this time.  It is okay for her to try going off the trospium and seeing whether or not that makes any difference in terms of urination to be off of it.    Subjective: 76-year-old female presenting primarily for the right shoulder pain and here with her .  Despite the note from the nurse when she called last week, she has not brought with any blood sugar readings.  She has had the shoulder pain for about 1 month, does not recall any specific injury, but notes it hurts to move her right arm at all.  Her  wonders if she could have bone cancer or other serious trouble.  She has had blood sugars that have been as high as 3-400 but again does not bring in any of the readings here since I last saw her.  She has not changed her diet at all and she is still having candy frequently throughout the day.  She had been started on trospium by the [facility] previously for her bladder.  She feels it does not work well for that.  I had done refills on that but had not originally ordered it.  [facility]  Current Outpatient Prescriptions   Medication Sig Dispense Refill     ACCU-CHEK MUNIRA PLUS TEST STRP strips USE 1 STRIP AS DIRECTED TWO TIMES A  each 2     albuterol (PROAIR HFA) 90 mcg/actuation inhaler Inhale 1-2 puffs every 4 (four) hours as needed. Every 4-6 hours as needed.       albuterol (PROVENTIL) 2.5 mg /3 mL (0.083 %)  "nebulizer solution Take 2.5 mg by nebulization every 6 (six) hours as needed for wheezing.       atorvastatin (LIPITOR) 80 MG tablet TAKE ONE TABLET BY MOUTH AT BEDTIME 90 tablet 1     cholecalciferol, vitamin D3, (VITAMIN D3) 1,000 unit capsule Take 1,000 Units by mouth daily.       DILT- mg 24 hr capsule        DULoxetine (CYMBALTA) 30 MG capsule TAKE ONE CAPSULE BY MOUTH EVERY EVENING 90 capsule 1     DULoxetine (CYMBALTA) 60 MG capsule TAKE ONE CAPSULE BY MOUTH EVERY DAY 90 capsule 1     lancing device with lancets (ACCU-CHEK MULTICLIX LANCET) Kit Use 1 strip As Directed 2 (two) times a day. 200 each 5     metFORMIN (GLUCOPHAGE) 1000 MG tablet TAKE ONE TABLET BY MOUTH TWO TIMES A DAY WITH MEALS 180 tablet 0     rivaroxaban (XARELTO) 20 mg Tab Take 1 tablet (20 mg total) by mouth daily with supper. 90 tablet 3     SPIRIVA WITH HANDIHALER 18 mcg inhalation capsule INHALE THE CONTENTS OF 1 CAPSULE ONCE DAILY USING HANDIHALER DEVICE. TO GET FULL DOSE, BREATHE OUT AND INHALE ONCE AGAIN. 90 capsule 1     trospium (SANCTURA) 20 mg tablet TAKE ONE TABLET BY MOUTH EVERY DAY 90 tablet 0     VIT C/E/ZN/COPPR/LUTEIN/ZEAXAN (PRESERVISION AREDS 2 ORAL) Take 1 tablet by mouth 2 (two) times a day.       No current facility-administered medications for this visit.      Allergies   Allergen Reactions     Codeine Nausea And Vomiting     Meperidine Unknown     Pt does not recall     Tetanus Toxoid Unknown     Tramadol Other (See Comments)     Annotation: legs restless and felt pain worse       Doxycycline Hyclate Rash     Sulfa (Sulfonamide Antibiotics) Rash     Annotation: see on call nursing note- itchy raised rash after on tmp-smz for 7 days       Objective:/80  Pulse 98  Resp 28  Ht 5' 2.25\" (1.581 m)  Wt (!) 242 lb (109.8 kg)  SpO2 92%  BMI 43.91 kg/m2  HEENT examination shows no acute change.  She is wearing chronic oxygen.  Lungs clear.  Heart regular rate and rhythm.  She is alert with clear speech.  I do " not feel any bony tenderness on the clavicle or scapula or the humerus or the AC joint.  But any movement of the shoulder definitely triggers more pain for her and she tenses up the muscles significantly.  No axillary adenopathy.  Right shoulder x-ray shows no abnormality by my reading.  Certainly no sign of any bony tumor.

## 2021-06-12 NOTE — PROGRESS NOTES
[facility]    Facility:   [facility] [[id number]]   Code Status: FULL CODE  PCP: Ron Moon MD   Phone: [phone]   Fax: [phone]      CHIEF COMPLAINT/REASON FOR VISIT:  Chief Complaint   Patient presents with     Review Of Multiple Medical Conditions       HISTORY COURSE:  Jody is a 76 y.o. female who presents to the hospital with right shoulder pain and shortness of breath.  Patient arrived via EMS report for concerns of COPD exacerbation and right shoulder pain.  She stated that she has had this pain for weeks, has not been evaluated primary care and orthopedics.  On July 12, 2017 the patient states that she she was getting in the bathroom when her  saw that she was short of breath and said that he is not doing this for the rest of the week.  EMS was called and the patient was brought to the emergency room for further evaluation.  In the ER she endorsed right shoulder pain, denied any change, described it as stiff and rates it 5 out of 10 on a pain scale.  Previous MRI of the right shoulder was conducted on July 6, 2017.  Conclusion was no evidence of partial or full-thickness rotator rotator cuff tear.  No evidence of a partial or full-thickness rotator cuff tear. Mild tendinopathy in the subscapularis and supraspinatus tendons. Moderate-sized glenohumeral joint effusion with some localized synovitis in the subscapularis recess but no evidence of intra-articular loose bodies. Mild degenerative osteoarthritic changes at the glenohumeral.  She was diagnosed with right shoulder pain related to frozen shoulder. A x-ray of the chest was completed on July 12, 2017 and it revealed there was opacification present peripherally on the right upper lobe which could relate to developing pneumonia.  She was given IV fluids and DuoNeb for COPD exacerbation.  Per possibility of pneumonia and comorbidities she was given ceftriaxone and discharged on Levaquin,  which will end on July 22, 2017.  Her labs showed no acute concerning findings.  Troponin was negative ECG was nonischemic, though PCO2 was 68 though she is a retainer with a home oxygen 3 L per nasal cannula.      TCU: Today I found Jody laying in bed with complaints of right upper extremity pain.  She relates her pain to more therapy.  We increased her tramadol to 50mg every 6 hours as needed and continue Tylenol 1000 mg 3 times daily.    Today she states her pain is stable.  We also talked about her blood sugars with a recent titration of Lantus.  She continues to require Lantus titration.  We have increased her Lantus to 30 units at bedtime her blood sugars have been ranging from 150s-220s p.m.   Previously we have titrated her oxygen though currently she is at 2 L nasal cannula, today she is on 3 L with orders not to titrate further. She still reports being dyspneic on exertion.  Previously we have noticed that her blood pressures have been elevated so we initiated lisinopril 2.5 mg daily.  We also noticed that her heart rates been elevated so I will start metoprolol succinate 12.5 mg p.o. daily.      Patient headache, chest pain, numbness or tingling, shortest of breath when sitting though is on 3 L  NC chronically, eating or swallowing concerns, nausea or vomiting, diarrhea or bowel abnormalities, or no new integumentary concerns today.  Today she asked me if I can give her ibuprofen as she has before.  I informed her that based on her current anticoagulation that is not recommended..    Review of Systems   Constitutional: Positive for activity change, appetite change and fatigue. Negative for diaphoresis and fever.        No acute distress   HENT: Negative for congestion, ear discharge and facial swelling.    Eyes: Negative.    Respiratory: Positive for shortness of breath. Negative for apnea.         SUAREZ, on 3L chronically   Cardiovascular: Negative for chest pain.   Gastrointestinal: Negative for  abdominal distention, abdominal pain and constipation.   Endocrine: Negative.    Genitourinary: Negative for dysuria and frequency.   Musculoskeletal: Negative for back pain and myalgias.   Skin: Negative.  Negative for color change.   Allergic/Immunologic: Negative.    Neurological: Negative for tremors, seizures and headaches.   Hematological: Negative.    Psychiatric/Behavioral: Negative for confusion and sleep disturbance. The patient is nervous/anxious.        Vitals:    08/14/17 1030   BP: 144/67   Pulse: 75   Resp: 21   Temp: 97.7  F (36.5  C)   SpO2: 95%   Weight: (!) 224 lb 12.8 oz (102 kg)       Physical Exam   Constitutional: No distress.   No acute concerns   HENT:   Head: Normocephalic and atraumatic.   Eyes: Right eye exhibits no discharge. Left eye exhibits no discharge.   Neck: Normal range of motion.   Cardiovascular: Exam reveals no gallop and no friction rub.    No murmur heard.  Irregular rate and rthythm   Pulmonary/Chest: Effort normal. She has no wheezes. She has no rales.   SUAREZ, 3L NC   Abdominal: Soft. Bowel sounds are normal. She exhibits no distension. There is no tenderness.   Musculoskeletal: She exhibits edema.   1-2 LE bilateral edema, weakness in R UE   Neurological:   A/O x2-3   Skin: Skin is warm and dry. She is not diaphoretic.       MEDICATION LIST:  Current Outpatient Prescriptions   Medication Sig     ACCU-CHEK MUNIRA PLUS TEST STRP strips USE 1 STRIP AS DIRECTED TWO TIMES A DAY     acetaminophen (TYLENOL) 500 MG tablet Take 1,000 mg by mouth 3 (three) times a day as needed for pain.     albuterol (PROAIR HFA) 90 mcg/actuation inhaler Inhale 1-2 puffs every 4 (four) hours as needed. Every 4-6 hours as needed.     albuterol (PROVENTIL) 2.5 mg /3 mL (0.083 %) nebulizer solution Take 2.5 mg by nebulization every 6 (six) hours as needed for wheezing.     atorvastatin (LIPITOR) 80 MG tablet Take 80 mg by mouth at bedtime.     DILT- mg 24 hr capsule Take 1 capsule (180 mg total)  by mouth daily.     DULoxetine (CYMBALTA) 30 MG capsule Take 30 mg by mouth every evening.     DULoxetine (CYMBALTA) 30 MG capsule TAKE ONE CAPSULE BY MOUTH EVERY EVENING     DULoxetine (CYMBALTA) 60 MG capsule Take 60 mg by mouth daily.     insulin glargine (LANTUS) 100 unit/mL injection Inject 30 Units under the skin at bedtime.      lancing device with lancets (ACCU-CHEK MULTICLIX LANCET) Kit Use 1 strip As Directed 2 (two) times a day.     lisinopril (PRINIVIL,ZESTRIL) 2.5 MG tablet Take 5 mg by mouth daily.      metFORMIN (GLUCOPHAGE) 1000 MG tablet Take 1 tablet (1,000 mg total) by mouth 2 (two) times a day with meals.     metoprolol succinate (TOPROL-XL) 25 MG Take 12.5 mg by mouth daily.     rivaroxaban (XARELTO) 20 mg Tab Take 1 tablet (20 mg total) by mouth daily with supper.     tiotropium (SPIRIVA) 18 mcg inhalation capsule Place 18 mcg into inhaler and inhale daily.     traMADol (ULTRAM) 50 mg tablet Take 50 mg by mouth every 6 (six) hours as needed for pain.      trospium (SANCTURA) 20 mg tablet Take 1 tablet (20 mg total) by mouth daily.       DISCHARGE DIAGNOSIS:  COPD with hypoxic respiratory failure presenting to the hospital with acute shortness of breath suspected to be in part due to noncompliance. She is on baseline oxygen needs at 3 L by nasal cannula with no weaning attempts to be made.  Right shoulder pain with the imaging in the hospital done did not show any partial or full-thickness rotator cuff tear however some synovitis with joint effusion was noted and degenerative changes were needed. Conservative treatment with pain management scheduling her Tylenol and therapy if no improvement she can consider a visit to orthopedics for further management. Now also has  tramadol available with an increase in dosage recently to 50 mg as needed  Diabetes type 2 poorly managed. Currently on Lantus 30 units along with Metformin and blood sugars are stable 150-200s.  Paroxysmal atrial fibrillation  continue with her Xarelto.  May need to be reassessed if she continues to fall frequently.  She also takes diltiazem daily for rate control.  Hypertension on metoprolol and lisinopril low doses  Hyperlipidemia continue with her Lipitor 80 mg  Mood disorder with anxiety she is taking Cymbalta at a high dose monitor closely  Lymphedema with no change in weights will add Lasix 20 mg to her regimen and also order some wraps.    MEDICAL EQUIPMENT NEEDS:  NA    DISCHARGE PLAN/FACE TO FACE:  I certify that services are/were furnished while this patient was under the care of a physician and that a physician or an allowed non-physician practitioner (NPP), had a face-to-face encounter that meets the physician face-to-face encounter requirements. The encounter was in whole, or in part, related to the primary reason for home health. The patient is confined to his/her home and needs intermittent skilled nursing, physical therapy, speech-language pathology, or the continued need for occupational therapy. A plan of care has been established by a physician and is periodically reviewed by a physician.  Date of Face-to-Face Encounter: 8/14/17    I certify that, based on my findings, the following services are medically necessary home health services: PT/OT to eval and treat.  Discharge to [facility].    My clinical findings support the need for the above skilled services because: she requires LTC    The patient is, or has been, under my care and I have initiated the establishment of the plan of care. This patient will be followed by a physician who will periodically review the plan of care.      Electronically signed by: Jerardo Cooper NP

## 2021-06-12 NOTE — PROGRESS NOTES
[facility]      Code Status:  FULL CODE  Visit Type: Review Of Multiple Medical Conditions     Facility:  [facility] [[id number]]           History of Present Illness: Jody Gil is a 76 y.o. female who is currently admitted to the TCU as a transfer from the emergency room.  She was examined today and history was also supplemented by her  who requested to meet with me as per the history this is a elderly patient who has been progressively declining  In her home for the last few years worse in the last 6 months as per the .  He has noticed increasing physical debility with frequent falls and inability to meet her own needs.  He has been her main caregiver and has been assisting her more and more every day in her ADLs.  He is noticing more episodes of confusion.  She presented to the hospital with shoulder pain associated with shortness of breath.  She does not have much recall of what eventually happened in the emergency room but it was decided at that time that she may have some bronchitis and she was given Levaquin for 10 days.  Her oxygen needs are at baseline at 3 L she is refusing to take her inhalers today.  She is also diabetic and has not been managing her blood sugars very well her A1c was greater than 10 and she has been started on Lantus.  Currently on 30 units of Lantus with significant improvement in blood sugars  She continues to complain of shoulder pain and has difficulty ambulating and is in the TCU for rehab.   Her weights are down almost 20 pounds and I am sure this is awaiting at her because clinically she does not look like she has lost weight  She is now on a higher dose of tramadol for pain management.  She is complaining of coccygeal pain also from prolonged sitting and nursing was requested to order a bariatric bed for her    Past Medical History:   Diagnosis Date     COPD (chronic obstructive pulmonary disease)      Past Surgical  History:   Procedure Laterality Date     VA REMOVAL OF TONSILS,<11 Y/O      Description: Tonsillectomy;  Recorded: 04/15/2008;     [facility] TOTAL HIP ARTHROPLASTY      Description: Total Hip Replacement;  Proc Date: 07/21/2010;  Comments: at [facility]     No family history on file.  Social History     Social History     Marital status:      Spouse name: N/A     Number of children: N/A     Years of education: N/A     Occupational History     Not on file.     Social History Main Topics     Smoking status: Former Smoker     Smokeless tobacco: Not on file     Alcohol use Yes      Comment: 4 beers per year     Drug use: No     Sexual activity: Not on file     Other Topics Concern     Not on file     Social History Narrative     Current Outpatient Prescriptions   Medication Sig Dispense Refill     ACCU-CHEK MUNIRA PLUS TEST STRP strips USE 1 STRIP AS DIRECTED TWO TIMES A  each 2     acetaminophen (TYLENOL) 500 MG tablet Take 1,000 mg by mouth 3 (three) times a day as needed for pain.       albuterol (PROAIR HFA) 90 mcg/actuation inhaler Inhale 1-2 puffs every 4 (four) hours as needed. Every 4-6 hours as needed.       albuterol (PROVENTIL) 2.5 mg /3 mL (0.083 %) nebulizer solution Take 2.5 mg by nebulization every 6 (six) hours as needed for wheezing.       atorvastatin (LIPITOR) 80 MG tablet Take 80 mg by mouth at bedtime.       atorvastatin (LIPITOR) 80 MG tablet TAKE ONE TABLET BY MOUTH AT BEDTIME 90 tablet 1     cholecalciferol, vitamin D3, (VITAMIN D3) 1,000 unit capsule Take 1,000 Units by mouth daily.       DILT- mg 24 hr capsule Take 1 capsule (180 mg total) by mouth daily. 90 capsule 1     DULoxetine (CYMBALTA) 30 MG capsule Take 30 mg by mouth every evening.       DULoxetine (CYMBALTA) 30 MG capsule TAKE ONE CAPSULE BY MOUTH EVERY EVENING 90 capsule 1     DULoxetine (CYMBALTA) 60 MG capsule Take 60 mg by mouth daily.       insulin glargine (LANTUS) 100 unit/mL injection Inject 30 Units under the  skin at bedtime.        lancing device with lancets (ACCU-CHEK MULTICLIX LANCET) Kit Use 1 strip As Directed 2 (two) times a day. 200 each 5     lisinopril (PRINIVIL,ZESTRIL) 2.5 MG tablet Take 5 mg by mouth daily.        metFORMIN (GLUCOPHAGE) 1000 MG tablet Take 1 tablet (1,000 mg total) by mouth 2 (two) times a day with meals. 180 tablet 0     metoprolol succinate (TOPROL-XL) 25 MG Take 12.5 mg by mouth daily.       rivaroxaban (XARELTO) 20 mg Tab Take 1 tablet (20 mg total) by mouth daily with supper. 90 tablet 3     tiotropium (SPIRIVA) 18 mcg inhalation capsule Place 18 mcg into inhaler and inhale daily.       traMADol (ULTRAM) 50 mg tablet Take 50 mg by mouth every 6 (six) hours as needed for pain.        trospium (SANCTURA) 20 mg tablet Take 1 tablet (20 mg total) by mouth daily. 90 tablet 0     VIT C/E/ZN/COPPR/LUTEIN/ZEAXAN (PRESERVISION AREDS 2 ORAL) Take 1 tablet by mouth 2 (two) times a day.       No current facility-administered medications for this visit.      Allergies   Allergen Reactions     Codeine Nausea And Vomiting     Meperidine Unknown     Pt does not recall     Tetanus Toxoid Unknown     Tramadol Other (See Comments)     Annotation: legs restless and felt pain worse       Doxycycline Hyclate Rash     Sulfa (Sulfonamide Antibiotics) Rash     Annotation: see on call nursing note- itchy raised rash after on tmp-smz for 7 days           Review of Systems:    Constitutional: Negative.  Negative for fever, chills,HAS activity change, appetite change and fatigue.   HENT: Negative for congestion and facial swelling.    Eyes: Negative for photophobia, redness and visual disturbance.   Respiratory: Negative for cough and chest tightness.    Cardiovascular: Negative for chest pain, palpitations and leg swelling.   Gastrointestinal: Negative for nausea, diarrhea, constipation, blood in stool and abdominal distention.   Genitourinary: Negative.    Has developed urinary incontinence  Musculoskeletal:  Negative.  falls often  Skin: Negative.    Neurological: Negative for dizziness, tremors, syncope, weakness, light-headedness and headaches.   Hematological: Does not bruise/bleed easily.   Psychiatric/Behavioral: Negative.  And no longer participate in her needs because of progressive cognitive decline as per     Vitals:    08/09/17 1506   BP: 114/79   Pulse: 76   Temp: 97.7  F (36.5  C)       Physical Exam:    GENERAL: no acute distress. Cooperative in conversation. Obese;rambling  HEENT: pupils are equal, round and reactive. Oral mucosa is moist and intact.  RESP:Chest symmetric. Regular respiratory rate. No stridor.  CVS: S1S2. IRREGULAR  ABD: Nondistended, soft.  EXTREMITIES: L>R  lower extremity edema.   NEURO: non focal. Alert and oriented x3. CONFUSED AND she has a limited recall  PSYCH: within normal limits. No depression or anxiety.  SKIN: warm dry intact     Labs:    Recent Results (from the past 240 hour(s))   Basic Metabolic Panel   Result Value Ref Range    Sodium 139 136 - 145 mmol/L    Potassium 4.2 3.5 - 5.0 mmol/L    Chloride 96 (L) 98 - 107 mmol/L    CO2 34 (H) 22 - 31 mmol/L    Anion Gap, Calculation 9 5 - 18 mmol/L    Glucose 194 (H) 70 - 125 mg/dL    Calcium 10.9 (H) 8.5 - 10.5 mg/dL    BUN 11 8 - 28 mg/dL    Creatinine 0.67 0.60 - 1.10 mg/dL    GFR MDRD Af Amer >60 >60 mL/min/1.73m2    GFR MDRD Non Af Amer >60 >60 mL/min/1.73m2       Mr Shoulder Without Contrast Right    Result Date: 7/6/2017   CONCLUSION: 1.  No evidence of a partial or full-thickness rotator cuff tear. 2.  Mild tendinopathy in the subscapularis and supraspinatus tendons. 3.  Moderate-sized glenohumeral joint effusion with some localized synovitis in the subscapularis recess but no evidence of intra-articular loose bodies. 4.  Mild degenerative osteoarthritic changes at the glenohumeral joint.   Lab Results   Component Value Date    HGBA1C 10.4 (H) 04/24/2017       Assessment/Plan:   COPD with hypoxic respiratory  failure presenting to the hospital with acute shortness of breath suspected to be in part due to noncompliance in my mind after discussing her concerns with her  as well as her  She is on baseline oxygen needs at 3 L by nasal cannula with no weaning attempt to be MADE.  Right shoulder pain with the imaging in the hospital done did not show any partial or full-thickness rotator cuff tear however some synovitis with joint effusion was noted and degenerative changes were needed  Conservative treatment with pain management scheduling her Tylenol and therapy if no improvement she can consider a visit to orthopedics for further management  Now also has  tramadol available with an increase in dosage recently to 50 mg as needed  Diabetes type 2 poorly managed .    Currently on Lantus 30 units along with Metformin and blood sugars are stable less than 180  Paroxysmal atrial fibrillation continue with her Xarelto.  May need to be reassessed if she continues to fall frequently  She also takes diltiazem daily  On metoprolol and lisinopril low doses  Hyperlipidemia continue with her Lipitor 80 mg  Mood disorder with anxiety she is taking Cymbalta at a high dose monitor closely  Lymphedema with no change in weights will add Lasix 20 mg to her regimen and also order some wraps-  Weights are down by almost 25 pounds.  Cognitive decline associated with failure to thrive decline in self-care increasing urinary incontinence she is here for PT OT and rehab and I had a detailed discussion with her  who is looking at long-term care placement for her.  Her initial testing slums score was 7/30 indicating significant impairment  He feels that he can no longer meet her needs and is looking at long-term placement with eventual decision to move her to [place] to be closer to the daughter.  She is currently participating in PT and OT.  She is walking about 20 feet using a rolling walker and remains a moderate assistance with  most of her ADLs requiring cognitive assistance with almost everything  Hypercalcemia-plan is to discontinue her PreserVision; her vitamin D supplementation and recheck a calcium again soon.  Chart review was done and her calcium has been on the higher side greater than 10 consistently for greater than 1 year.  I suspect she is a little dehydrated.  If her calciums remain high we will consider discontinuing her Lasix  Total time spent was 35 minutes, more than half of it was in face-to-face counseling regarding disease state, treatment, side effects, documentation, review of clinical data and coordination of care    Electronically signed by: RO Jaime  This progress note was completed using Dragon software and there may be grammatical errors.

## 2021-06-12 NOTE — PROGRESS NOTES
[facility]      Facility:    [facility] [[id number]]  Code Status: UNKNOWN      Chief Complaint/Reason for Visit:  Chief Complaint   Patient presents with     Review Of Multiple Medical Conditions       HPI:   Jody is a 76 y.o. female who presents to the hospital with right shoulder pain and shortness of breath.  Patient arrived via EMS report for concerns of COPD exacerbation and right shoulder pain.  She stated that she has had this pain for weeks, has not been evaluated primary care and orthopedics.  On July 12, 2017 the patient states that she she was getting in the bathroom when her  saw that she was short of breath and said that he is not doing this for the rest of the week.  EMS was called and the patient was brought to the emergency room for further evaluation.  In the ER she endorsed right shoulder pain, denied any change, described it as stiff and rates it 5 out of 10 on a pain scale.  Previous MRI of the right shoulder was conducted on July 6, 2017.  Conclusion was no evidence of partial or full-thickness rotator rotator cuff tear.  No evidence of a partial or full-thickness rotator cuff tear. Mild tendinopathy in the subscapularis and supraspinatus tendons. Moderate-sized glenohumeral joint effusion with some localized synovitis in the subscapularis recess but no evidence of intra-articular loose bodies. Mild degenerative osteoarthritic changes at the glenohumeral.  She was diagnosed with right shoulder pain related to frozen shoulder. A x-ray of the chest was completed on July 12, 2017 and it revealed there was opacification present peripherally on the right upper lobe which could relate to developing pneumonia.  She was given IV fluids and DuoNeb for COPD exacerbation.  Per possibility of pneumonia and comorbidities she was given ceftriaxone and discharged on Levaquin, which will end on July 22, 2017.  Her labs showed no acute concerning findings.   Troponin was negative ECG was nonischemic, though PCO2 was 68 though she is a retainer with a home oxygen 3 L per nasal cannula.     TCU: Today I found Jody laying in bed with complaints of right upper extremity pain.  She relates her pain to more therapy.  Will start tramadol 25 mg every 6 hours as needed and continue Tylenol 1000 mg 3 times daily.  We also talked about her blood sugars with a recent titration of Lantus.  Today her a.m. blood sugars have been 150s 175, 140-195 at lunch, 146-185 at suppertime, and 132-48 at bedtime today I am going to increase her to 20 units at at bedtime.  Her blood sugars fasting have been 150-159, lunch 219-305, and supper 121-214, and at bedtime 132-227.  Today I am going to increase her Lantus from 26-28 units at bedtime.  We have also trying to wean her off her oxygen though currently she is at 2 L nasal cannula.  Today's she stated that she does not feel difference with the O2 reduction.  We have also noticed that her blood pressures have been elevated so I will increase her lisinopril to 5 mg daily and check a BMP in 1 week.     Patient headache, chest pain, numbness or tingling, shortest of breath when sitting though is on 2L NC chronically, eating or swallowing concerns, nausea or vomiting, diarrhea or bowel abnormalities, or no new integumentary concerns today.  She also states that she does not use her menthol ointment so I will DC.    Past Medical History:  Past Medical History:   Diagnosis Date     COPD (chronic obstructive pulmonary disease)            Surgical History:  Past Surgical History:   Procedure Laterality Date     IN REMOVAL OF TONSILS,<13 Y/O      Description: Tonsillectomy;  Recorded: 04/15/2008;     IN TOTAL HIP ARTHROPLASTY      Description: Total Hip Replacement;  Proc Date: 07/21/2010;  Comments: at [facility]       Family History:   No family history on file.    Social History:    Social History     Social History     Marital status:       "Spouse name: N/A     Number of children: N/A     Years of education: N/A     Social History Main Topics     Smoking status: Former Smoker     Smokeless tobacco: Not on file     Alcohol use Yes      Comment: 4 beers per year     Drug use: No     Sexual activity: Not on file     Other Topics Concern     Not on file     Social History Narrative        Review of Systems   Constitutional: Positive for activity change, appetite change and fatigue. Negative for diaphoresis.   HENT: Negative.  Negative for congestion and facial swelling.    Eyes: Negative.    Respiratory: Positive for shortness of breath. Negative for cough, chest tightness, wheezing and stridor.         SOB when doing therapy, on 2L NC (weaned from 3L)   Cardiovascular: Negative.  Negative for chest pain.   Endocrine: Negative.    Genitourinary: Negative.    Musculoskeletal: Positive for back pain.        Severe weakness on R UE   Skin: Negative.    Allergic/Immunologic: Negative.    Neurological: Positive for weakness. Negative for tremors, numbness and headaches.   Psychiatric/Behavioral: Positive for confusion. Negative for agitation and sleep disturbance. The patient is nervous/anxious.         A/O x2-3, some cognitive slowing noted       Vitals:    07/31/17 1132   BP: 150/80   Pulse: 78   Resp: 18   Temp: 98  F (36.7  C)   SpO2: 93%   Weight: (!) 235 lb (106.6 kg)   Height: 5' 2\" (1.575 m)       Physical Exam   Constitutional: She appears well-developed.   HENT:   Head: Normocephalic and atraumatic.   Eyes: Right eye exhibits no discharge. Left eye exhibits no discharge. No scleral icterus.   Neck: Normal range of motion. Neck supple.   Cardiovascular: Exam reveals no gallop and no friction rub.    Murmur heard.  Irregular rate and rhythm, rate controlled, heart tones distant   Pulmonary/Chest: No stridor. No respiratory distress. She has no wheezes.   Diminished, clear, on 2L NC   Abdominal: Soft. Bowel sounds are normal. She exhibits no distension. "   Musculoskeletal: She exhibits edema and tenderness.   Weakness on R UE, 1-2+ edema bilaterally LEs, continuous pain in R UE   Neurological: She is alert.   A/O x2-3   Skin: Skin is warm and dry. She is not diaphoretic.   Psychiatric:   Seemed aloof     Medication List:  Current Outpatient Prescriptions   Medication Sig     traMADol (ULTRAM) 50 mg tablet Take 25 mg by mouth every 6 (six) hours as needed for pain.     ACCU-fluid OperationsK MUNIRA PLUS TEST STRP strips USE 1 STRIP AS DIRECTED TWO TIMES A DAY     acetaminophen (TYLENOL) 500 MG tablet Take 1,000 mg by mouth 3 (three) times a day as needed for pain.     albuterol (PROAIR HFA) 90 mcg/actuation inhaler Inhale 1-2 puffs every 4 (four) hours as needed. Every 4-6 hours as needed.     albuterol (PROVENTIL) 2.5 mg /3 mL (0.083 %) nebulizer solution Take 2.5 mg by nebulization every 6 (six) hours as needed for wheezing.     atorvastatin (LIPITOR) 80 MG tablet Take 80 mg by mouth at bedtime.     atorvastatin (LIPITOR) 80 MG tablet TAKE ONE TABLET BY MOUTH AT BEDTIME     cholecalciferol, vitamin D3, (VITAMIN D3) 1,000 unit capsule Take 1,000 Units by mouth daily.     DILT- mg 24 hr capsule Take 1 capsule (180 mg total) by mouth daily.     DULoxetine (CYMBALTA) 30 MG capsule Take 30 mg by mouth every evening.     DULoxetine (CYMBALTA) 30 MG capsule TAKE ONE CAPSULE BY MOUTH EVERY EVENING     DULoxetine (CYMBALTA) 60 MG capsule Take 60 mg by mouth daily.     insulin glargine (LANTUS) 100 unit/mL injection Inject 28 Units under the skin at bedtime.      lancing device with lancets (ACCU-fluid OperationsK MULTICLIX LANCET) Kit Use 1 strip As Directed 2 (two) times a day.     lisinopril (PRINIVIL,ZESTRIL) 2.5 MG tablet Take 5 mg by mouth daily.      metFORMIN (GLUCOPHAGE) 1000 MG tablet Take 1 tablet (1,000 mg total) by mouth 2 (two) times a day with meals.     rivaroxaban (XARELTO) 20 mg Tab Take 1 tablet (20 mg total) by mouth daily with supper.     tiotropium (SPIRIVA) 18 mcg  inhalation capsule Place 18 mcg into inhaler and inhale daily.     trospium (SANCTURA) 20 mg tablet Take 1 tablet (20 mg total) by mouth daily.     VIT C/E/ZN/COPPR/LUTEIN/ZEAXAN (PRESERVISION AREDS 2 ORAL) Take 1 tablet by mouth 2 (two) times a day.       Labs:  Recent Results (from the past 240 hour(s))   Basic Metabolic Panel   Result Value Ref Range    Sodium 140 136 - 145 mmol/L    Potassium 4.3 3.5 - 5.0 mmol/L    Chloride 93 (L) 98 - 107 mmol/L    CO2 35 (H) 22 - 31 mmol/L    Anion Gap, Calculation 12 5 - 18 mmol/L    Glucose 194 (H) 70 - 125 mg/dL    Calcium 10.5 8.5 - 10.5 mg/dL    BUN 11 8 - 28 mg/dL    Creatinine 0.64 0.60 - 1.10 mg/dL    GFR MDRD Af Amer >60 >60 mL/min/1.73m2    GFR MDRD Non Af Amer >60 >60 mL/min/1.73m2     MRI OF THE RIGHT SHOULDER  7/6/2017 12:03 PM     INDICATION: 76-year-old with severe shoulder pain. No prior right shoulder surgery.  TECHNIQUE: Routine.  Exam was initially started on 06/28/2017, however because of hardware failure the patient had to return on 7/6/2017 for a complete study.  COMPARISON: 06/05/2017.     FINDINGS: The exam is mildly degraded by patient motion which persisted throughout the entire study.      ROTATOR CUFF: There is no evidence of a partial or full-thickness rotator cuff tear. There is mild tendinopathy in the subscapularis tendon as seen on series 3 image 12. Mild tendinopathy in the supraspinatus tendon as seen on series 5.2 image 15. The   infraspinatus and teres minor tendons are intact. No evidence of acute injury or atrophy of any of the rotator cuff muscles.     ACROMIOCLAVICULAR REGION: Type II acromion anatomy without significant enthesophyte formation. Moderate AC joint arthrosis with slight inferior articular spurring but no resulting subacromial space narrowing. Small amount of fluid in the   subacromial-subdeltoid recess.     GLENOHUMERAL JOINT: Moderate-sized glenohumeral joint effusion without evidence of intra-articular loose bodies.  There is some localized synovitis within the subscapularis recess. Mild diffuse partial-thickness articular cartilage thinning involving the   glenoid and humeral head. There is mild localized subchondral edema in the posterior margin of the glenoid rim. The long head of the biceps tendon is intact and normally located. The glenoid labrum is intact.     BONES AND SOFT TISSUES: No fracture or contusion. No focal marrow replacing lesion. No findings to suggest recent or prior humeral head dislocation.     IMPRESSION:   CONCLUSION:  1.  No evidence of a partial or full-thickness rotator cuff tear.     2.  Mild tendinopathy in the subscapularis and supraspinatus tendons.     3.  Moderate-sized glenohumeral joint effusion with some localized synovitis in the subscapularis recess but no evidence of intra-articular loose bodies.     4.  Mild degenerative osteoarthritic changes at the glenohumeral joint.    Assessment:    ICD-10-CM    1. Chronic pain syndrome G89.4    2. Benign Essential Hypertension I10    3. Type 2 diabetes mellitus with unspecified complications E11.8    4. Major depression F32.9      Plan:  1.  Start tramadol 25 mg every 6 hours as needed, continue Tylenol 1000mg 3 times daily  2.  Increase lisinopril to 5 mg daily, check BMP in 1 week  3.  Increase Lantus to 20 units at bedtime  4.  Continue duloxetine as prescribed    The care plan has been reviewed and all orders signed. Changes to care plan, if any, as noted. Otherwise, continue care plan of care.    Electronically signed by: Jerardo Cooper NP

## 2021-06-12 NOTE — PROGRESS NOTES
[facility]      Code Status:  FULL CODE  Visit Type: Review Of Multiple Medical Conditions     Facility:  [facility] [[id number]]           History of Present Illness: Jody Gil is a 76 y.o. female who is currently admitted to the TCU as a transfer from the emergency room.  She was examined today and history was also supplemented by her  who requested to meet with me as per the history this is a elderly patient who has been progressively declining  In her home for the last few years worse in the last 6 months as per the .  He has noticed increasing physical debility with frequent falls and inability to meet her own needs.  He has been her main caregiver and has been assisting her more and more every day in her ADLs.  He is noticing more episodes of confusion.  She presented to the hospital with shoulder pain associated with shortness of breath.  She does not have much recall of what eventually happened in the emergency room but it was decided at that time that she may have some bronchitis and she was given Levaquin for 10 days.  Her oxygen needs are at baseline at 3 L she is refusing to take her inhalers today.  She is also diabetic and has not been managing her blood sugars very well her A1c was greater than 10 and she has been started on Lantus.  Sugars remain in the 150s+ range in the TCU and she is now on a higher dose of Lantus at 22 units  She continues to complain of shoulder pain and has difficulty ambulating and is in the TCU for rehab.  She is also complaining of back pain and refusing the topical muscle rubs.    Past Medical History:   Diagnosis Date     COPD (chronic obstructive pulmonary disease)      Past Surgical History:   Procedure Laterality Date     RI REMOVAL OF TONSILS,<13 Y/O      Description: Tonsillectomy;  Recorded: 04/15/2008;     RI TOTAL HIP ARTHROPLASTY      Description: Total Hip Replacement;  Proc Date: 07/21/2010;   Comments: at [facility]     No family history on file.  Social History     Social History     Marital status:      Spouse name: N/A     Number of children: N/A     Years of education: N/A     Occupational History     Not on file.     Social History Main Topics     Smoking status: Former Smoker     Smokeless tobacco: Not on file     Alcohol use Yes      Comment: 4 beers per year     Drug use: No     Sexual activity: Not on file     Other Topics Concern     Not on file     Social History Narrative     Current Outpatient Prescriptions   Medication Sig Dispense Refill     ACCU-CHEK MUNIRA PLUS TEST STRP strips USE 1 STRIP AS DIRECTED TWO TIMES A  each 2     acetaminophen (TYLENOL) 500 MG tablet Take 1,000 mg by mouth 3 (three) times a day as needed for pain.       albuterol (PROAIR HFA) 90 mcg/actuation inhaler Inhale 1-2 puffs every 4 (four) hours as needed. Every 4-6 hours as needed.       albuterol (PROVENTIL) 2.5 mg /3 mL (0.083 %) nebulizer solution Take 2.5 mg by nebulization every 6 (six) hours as needed for wheezing.       atorvastatin (LIPITOR) 80 MG tablet Take 80 mg by mouth at bedtime.       atorvastatin (LIPITOR) 80 MG tablet TAKE ONE TABLET BY MOUTH AT BEDTIME 90 tablet 1     cholecalciferol, vitamin D3, (VITAMIN D3) 1,000 unit capsule Take 1,000 Units by mouth daily.       DILT- mg 24 hr capsule Take 1 capsule (180 mg total) by mouth daily. 90 capsule 1     DULoxetine (CYMBALTA) 30 MG capsule Take 30 mg by mouth every evening.       DULoxetine (CYMBALTA) 30 MG capsule TAKE ONE CAPSULE BY MOUTH EVERY EVENING 90 capsule 1     DULoxetine (CYMBALTA) 60 MG capsule Take 60 mg by mouth daily.       insulin glargine (LANTUS) 100 unit/mL injection Inject 20 Units under the skin at bedtime.        lancing device with lancets (ACCU-CHEK MULTICLIX LANCET) Kit Use 1 strip As Directed 2 (two) times a day. 200 each 5     lisinopril (PRINIVIL,ZESTRIL) 2.5 MG tablet Take 2.5 mg by mouth daily.        metFORMIN (GLUCOPHAGE) 1000 MG tablet Take 1 tablet (1,000 mg total) by mouth 2 (two) times a day with meals. 180 tablet 0     methyl salicylate-menthol (SARATH DIETZ GREASELESS) 15-10 % Crea Apply 1 application topically 2 (two) times a day. To right shoulder       rivaroxaban (XARELTO) 20 mg Tab Take 1 tablet (20 mg total) by mouth daily with supper. 90 tablet 3     tiotropium (SPIRIVA) 18 mcg inhalation capsule Place 18 mcg into inhaler and inhale daily.       trospium (SANCTURA) 20 mg tablet Take 1 tablet (20 mg total) by mouth daily. 90 tablet 0     VIT C/E/ZN/COPPR/LUTEIN/ZEAXAN (PRESERVISION AREDS 2 ORAL) Take 1 tablet by mouth 2 (two) times a day.       No current facility-administered medications for this visit.      Allergies   Allergen Reactions     Codeine Nausea And Vomiting     Meperidine Unknown     Pt does not recall     Tetanus Toxoid Unknown     Tramadol Other (See Comments)     Annotation: legs restless and felt pain worse       Doxycycline Hyclate Rash     Sulfa (Sulfonamide Antibiotics) Rash     Annotation: see on call nursing note- itchy raised rash after on tmp-smz for 7 days           Review of Systems:    Constitutional: Negative.  Negative for fever, chills,HAS activity change, appetite change and fatigue.   HENT: Negative for congestion and facial swelling.    Eyes: Negative for photophobia, redness and visual disturbance.   Respiratory: Negative for cough and chest tightness.    Cardiovascular: Negative for chest pain, palpitations and leg swelling.   Gastrointestinal: Negative for nausea, diarrhea, constipation, blood in stool and abdominal distention.   Genitourinary: Negative.    Has developed urinary incontinence  Musculoskeletal: Negative.  falls often  Skin: Negative.    Neurological: Negative for dizziness, tremors, syncope, weakness, light-headedness and headaches.   Hematological: Does not bruise/bleed easily.   Psychiatric/Behavioral: Negative.  And no longer participate in her needs  because of progressive cognitive decline as per     Vitals:    07/24/17 1015   BP: 128/63   Pulse: 78   Resp: 17   Temp: 97  F (36.1  C)       Physical Exam:    GENERAL: no acute distress. Cooperative in conversation. Obese;rambling  HEENT: pupils are equal, round and reactive. Oral mucosa is moist and intact.  RESP:Chest symmetric. Regular respiratory rate. No stridor.  CVS: S1S2. IRREGULAR  ABD: Nondistended, soft.  EXTREMITIES: L>R  lower extremity edema.   NEURO: non focal. Alert and oriented x3. CONFUSED AND she has a limited recall  PSYCH: within normal limits. No depression or anxiety.  SKIN: warm dry intact     Labs:    Recent Results (from the past 240 hour(s))   Vitamin D, Total (25-Hydroxy)   Result Value Ref Range    Vitamin D, Total (25-Hydroxy) 31.0 30.0 - 80.0 ng/mL     Xr Chest Ap    Result Date: 7/12/2017  XR CHEST AP 7/12/2017 8:41 AM INDICATION: sob COMPARISON: 12/25/2015 FINDINGS: Lung volumes lower on this AP projection. There is vague opacity present peripheral right upper lobe which could relate to developing pneumonia. Left lung base not well visualized, likely due to patient's size. Heart size is normal.    Xr Shoulder Right 2 Or More Vws    Result Date: 7/12/2017  XR SHOULDER RIGHT 2 OR MORE VWS 7/12/2017 8:41 AM INDICATION: Shoulder pain.    COMPARISON: 6/5/2017 FINDINGS: No significant change. Mild degenerative changes. No fracture or dislocation. There is a vague opacity involving the peripheral right upper lobe, consider follow-up chest x-ray.    Mr Shoulder Without Contrast Right    Result Date: 7/6/2017  MRI OF THE RIGHT SHOULDER 7/6/2017 12:03 PM INDICATION: 76-year-old with severe shoulder pain. No prior right shoulder surgery. TECHNIQUE: Routine. Exam was initially started on 06/28/2017, however because of hardware failure the patient had to return on 7/6/2017 for a complete study. COMPARISON: 06/05/2017. FINDINGS: The exam is mildly degraded by patient motion which  persisted throughout the entire study. ROTATOR CUFF: There is no evidence of a partial or full-thickness rotator cuff tear. There is mild tendinopathy in the subscapularis tendon as seen on series 3 image 12. Mild tendinopathy in the supraspinatus tendon as seen on series 5.2 image 15. The infraspinatus and teres minor tendons are intact. No evidence of acute injury or atrophy of any of the rotator cuff muscles. ACROMIOCLAVICULAR REGION: Type II acromion anatomy without significant enthesophyte formation. Moderate AC joint arthrosis with slight inferior articular spurring but no resulting subacromial space narrowing. Small amount of fluid in the subacromial-subdeltoid recess. GLENOHUMERAL JOINT: Moderate-sized glenohumeral joint effusion without evidence of intra-articular loose bodies. There is some localized synovitis within the subscapularis recess. Mild diffuse partial-thickness articular cartilage thinning involving the glenoid and humeral head. There is mild localized subchondral edema in the posterior margin of the glenoid rim. The long head of the biceps tendon is intact and normally located. The glenoid labrum is intact. BONES AND SOFT TISSUES: No fracture or contusion. No focal marrow replacing lesion. No findings to suggest recent or prior humeral head dislocation.     CONCLUSION: 1.  No evidence of a partial or full-thickness rotator cuff tear. 2.  Mild tendinopathy in the subscapularis and supraspinatus tendons. 3.  Moderate-sized glenohumeral joint effusion with some localized synovitis in the subscapularis recess but no evidence of intra-articular loose bodies. 4.  Mild degenerative osteoarthritic changes at the glenohumeral joint.   Lab Results   Component Value Date    HGBA1C 10.4 (H) 04/24/2017       Assessment/Plan:   COPD with hypoxic respiratory failure presenting to the hospital with acute shortness of breath suspected to be in part due to noncompliance in my mind after discussing her concerns  with her  as well as her  She is on baseline oxygen needs at 3 L by nasal cannula with no weaning attempt to be made  Currently on Levaquin for 10 days continue with the same  Right shoulder pain with the imaging in the hospital done did not show any partial or full-thickness rotator cuff tear however some synovitis with joint effusion was noted and degenerative changes were needed  Conservative treatment with pain management scheduling her Tylenol and therapy if no improvement she can consider a visit to orthopedics for further management  Now also has  tramadol available  Diabetes type 2 poorly managed .    Currently on Lantus 22 units along with Metformin and blood sugars are stable  Paroxysmal atrial fibrillation continue with her Xarelto.  May need to be reassessed if she continues to fall frequently  She also takes diltiazem daily  Hyperlipidemia continue with her Lipitor 80 mg  Mood disorder with anxiety she is taking Cymbalta at a high dose monitor closely  Lymphedema with no change in weights will add Lasix 20 mg to her regimen and also order some wraps-  Cognitive decline associated with failure to thrive decline in self-care increasing urinary incontinence she is here for PT OT and rehab and I had a detailed discussion with her  who is looking at long-term care placement for her.  Her initial testing slums score was 7/30 indicating significant impairment  He feels that he can no longer meet her needs and is looking at long-term placement with eventual decision to move her to [place] to be closer to the daughter.  She is currently participating in PT and OT.  She is walking about 20 feet using a rolling walker and remains a moderate assistance with most of her ADLs requiring cognitive assistance with almost everything    Electronically signed by: RO Jaime  This progress note was completed using Dragon software and there may be grammatical errors.

## 2021-06-12 NOTE — PROGRESS NOTES
ASSESSMENT: Onychauxis, diabetes, foot pain.    PLAN: Toenails were debrided manually and mechanically x10. Return to clinic in nine weeks.         SUBJECTIVE: Toenails are long, thick and painful. Last nail debridement in the clinic was April 27, 2017.  She is in transitional care after falls and increasing confusion at home.    OBJECTIVE:  Vascular: DP pulses are diminished. PT pulses are absent. Pedal hair is absent. Feet are cool to the touch.  Neuro: Sensation in the feet is grossly intact to light touch.  Derm:  Toenails are elongated, thickened and dystrophic with discoloration and subungual debris. Skin is thin and shiny but intact.   Musculoskeletal: Hammertoes. She walks with a cane.

## 2021-06-15 PROBLEM — H35.3290 NEOVASCULAR AGE-RELATED MACULAR DEGENERATION (H): Status: ACTIVE | Noted: 2017-02-28
